# Patient Record
Sex: FEMALE | Race: WHITE | NOT HISPANIC OR LATINO | Employment: OTHER | URBAN - METROPOLITAN AREA
[De-identification: names, ages, dates, MRNs, and addresses within clinical notes are randomized per-mention and may not be internally consistent; named-entity substitution may affect disease eponyms.]

---

## 2024-08-12 ENCOUNTER — APPOINTMENT (OUTPATIENT)
Dept: RADIOLOGY | Facility: HOSPITAL | Age: 64
End: 2024-08-12
Payer: MEDICARE

## 2024-08-12 ENCOUNTER — HOSPITAL ENCOUNTER (OUTPATIENT)
Facility: HOSPITAL | Age: 64
Setting detail: OBSERVATION
Discharge: HOME | End: 2024-08-14
Attending: STUDENT IN AN ORGANIZED HEALTH CARE EDUCATION/TRAINING PROGRAM | Admitting: INTERNAL MEDICINE
Payer: MEDICARE

## 2024-08-12 ENCOUNTER — APPOINTMENT (OUTPATIENT)
Dept: CARDIOLOGY | Facility: HOSPITAL | Age: 64
End: 2024-08-12
Payer: MEDICARE

## 2024-08-12 DIAGNOSIS — H81.11 BENIGN PAROXYSMAL POSITIONAL VERTIGO OF RIGHT EAR: ICD-10-CM

## 2024-08-12 DIAGNOSIS — E13.69 OTHER SPECIFIED DIABETES MELLITUS WITH OTHER SPECIFIED COMPLICATION, UNSPECIFIED WHETHER LONG TERM INSULIN USE (MULTI): ICD-10-CM

## 2024-08-12 DIAGNOSIS — K21.9 GASTROESOPHAGEAL REFLUX DISEASE, UNSPECIFIED WHETHER ESOPHAGITIS PRESENT: ICD-10-CM

## 2024-08-12 DIAGNOSIS — S01.01XA LACERATION OF OCCIPITAL SCALP, INITIAL ENCOUNTER: ICD-10-CM

## 2024-08-12 DIAGNOSIS — I10 PRIMARY HYPERTENSION: ICD-10-CM

## 2024-08-12 DIAGNOSIS — R55 SYNCOPE AND COLLAPSE: Primary | ICD-10-CM

## 2024-08-12 LAB
ALBUMIN SERPL BCP-MCNC: 4.3 G/DL (ref 3.4–5)
ALP SERPL-CCNC: 69 U/L (ref 33–136)
ALT SERPL W P-5'-P-CCNC: 11 U/L (ref 7–45)
ANION GAP BLDV CALCULATED.4IONS-SCNC: 4 MMOL/L (ref 10–25)
ANION GAP SERPL CALC-SCNC: 19 MMOL/L (ref 10–20)
APPEARANCE UR: ABNORMAL
AST SERPL W P-5'-P-CCNC: 10 U/L (ref 9–39)
BASE EXCESS BLDV CALC-SCNC: 1.2 MMOL/L (ref -2–3)
BASOPHILS # BLD AUTO: 0.11 X10*3/UL (ref 0–0.1)
BASOPHILS NFR BLD AUTO: 0.8 %
BILIRUB SERPL-MCNC: 0.9 MG/DL (ref 0–1.2)
BILIRUB UR STRIP.AUTO-MCNC: NEGATIVE MG/DL
BODY TEMPERATURE: ABNORMAL
BUN SERPL-MCNC: 21 MG/DL (ref 6–23)
CA-I BLDV-SCNC: 1.18 MMOL/L (ref 1.1–1.33)
CALCIUM SERPL-MCNC: 10 MG/DL (ref 8.6–10.3)
CARDIAC TROPONIN I PNL SERPL HS: 10 NG/L (ref 0–13)
CARDIAC TROPONIN I PNL SERPL HS: 8 NG/L (ref 0–13)
CHLORIDE BLDV-SCNC: 107 MMOL/L (ref 98–107)
CHLORIDE SERPL-SCNC: 101 MMOL/L (ref 98–107)
CO2 SERPL-SCNC: 25 MMOL/L (ref 21–32)
COLOR UR: ABNORMAL
CREAT SERPL-MCNC: 1.17 MG/DL (ref 0.5–1.05)
EGFRCR SERPLBLD CKD-EPI 2021: 52 ML/MIN/1.73M*2
EOSINOPHIL # BLD AUTO: 0.07 X10*3/UL (ref 0–0.7)
EOSINOPHIL NFR BLD AUTO: 0.5 %
ERYTHROCYTE [DISTWIDTH] IN BLOOD BY AUTOMATED COUNT: 12.9 % (ref 11.5–14.5)
GLUCOSE BLD MANUAL STRIP-MCNC: 163 MG/DL (ref 74–99)
GLUCOSE BLDV-MCNC: 158 MG/DL (ref 74–99)
GLUCOSE SERPL-MCNC: 155 MG/DL (ref 74–99)
GLUCOSE UR STRIP.AUTO-MCNC: NORMAL MG/DL
HCO3 BLDV-SCNC: 27.2 MMOL/L (ref 22–26)
HCT VFR BLD AUTO: 49.2 % (ref 36–46)
HCT VFR BLD EST: 46 % (ref 36–46)
HGB BLD-MCNC: 16.4 G/DL (ref 12–16)
HGB BLDV-MCNC: 15.3 G/DL (ref 12–16)
IMM GRANULOCYTES # BLD AUTO: 0.05 X10*3/UL (ref 0–0.7)
IMM GRANULOCYTES NFR BLD AUTO: 0.3 % (ref 0–0.9)
INHALED O2 CONCENTRATION: 21 %
KETONES UR STRIP.AUTO-MCNC: NEGATIVE MG/DL
LACTATE BLDV-SCNC: 1.4 MMOL/L (ref 0.4–2)
LEUKOCYTE ESTERASE UR QL STRIP.AUTO: ABNORMAL
LYMPHOCYTES # BLD AUTO: 3.55 X10*3/UL (ref 1.2–4.8)
LYMPHOCYTES NFR BLD AUTO: 24.8 %
MAGNESIUM SERPL-MCNC: 1.9 MG/DL (ref 1.6–2.4)
MCH RBC QN AUTO: 29.1 PG (ref 26–34)
MCHC RBC AUTO-ENTMCNC: 33.3 G/DL (ref 32–36)
MCV RBC AUTO: 87 FL (ref 80–100)
MONOCYTES # BLD AUTO: 0.98 X10*3/UL (ref 0.1–1)
MONOCYTES NFR BLD AUTO: 6.8 %
MUCOUS THREADS #/AREA URNS AUTO: ABNORMAL /LPF
NEUTROPHILS # BLD AUTO: 9.55 X10*3/UL (ref 1.2–7.7)
NEUTROPHILS NFR BLD AUTO: 66.8 %
NITRITE UR QL STRIP.AUTO: ABNORMAL
NRBC BLD-RTO: 0 /100 WBCS (ref 0–0)
OXYHGB MFR BLDV: 31.3 % (ref 45–75)
PCO2 BLDV: 47 MM HG (ref 41–51)
PH BLDV: 7.37 PH (ref 7.33–7.43)
PH UR STRIP.AUTO: 5 [PH]
PLATELET # BLD AUTO: 378 X10*3/UL (ref 150–450)
PO2 BLDV: 23 MM HG (ref 35–45)
POTASSIUM BLDV-SCNC: 3.9 MMOL/L (ref 3.5–5.3)
POTASSIUM SERPL-SCNC: 3.7 MMOL/L (ref 3.5–5.3)
PROT SERPL-MCNC: 7.2 G/DL (ref 6.4–8.2)
PROT UR STRIP.AUTO-MCNC: ABNORMAL MG/DL
RBC # BLD AUTO: 5.64 X10*6/UL (ref 4–5.2)
RBC # UR STRIP.AUTO: NEGATIVE /UL
RBC #/AREA URNS AUTO: ABNORMAL /HPF
SAO2 % BLDV: 33 % (ref 45–75)
SODIUM BLDV-SCNC: 134 MMOL/L (ref 136–145)
SODIUM SERPL-SCNC: 141 MMOL/L (ref 136–145)
SP GR UR STRIP.AUTO: 1.02
SQUAMOUS #/AREA URNS AUTO: ABNORMAL /HPF
UROBILINOGEN UR STRIP.AUTO-MCNC: NORMAL MG/DL
WBC # BLD AUTO: 14.3 X10*3/UL (ref 4.4–11.3)
WBC #/AREA URNS AUTO: ABNORMAL /HPF

## 2024-08-12 PROCEDURE — 72125 CT NECK SPINE W/O DYE: CPT | Performed by: RADIOLOGY

## 2024-08-12 PROCEDURE — 96361 HYDRATE IV INFUSION ADD-ON: CPT

## 2024-08-12 PROCEDURE — 96360 HYDRATION IV INFUSION INIT: CPT

## 2024-08-12 PROCEDURE — G0378 HOSPITAL OBSERVATION PER HR: HCPCS

## 2024-08-12 PROCEDURE — 83735 ASSAY OF MAGNESIUM: CPT

## 2024-08-12 PROCEDURE — 93005 ELECTROCARDIOGRAM TRACING: CPT

## 2024-08-12 PROCEDURE — 99285 EMERGENCY DEPT VISIT HI MDM: CPT | Mod: 25

## 2024-08-12 PROCEDURE — 71045 X-RAY EXAM CHEST 1 VIEW: CPT

## 2024-08-12 PROCEDURE — 70450 CT HEAD/BRAIN W/O DYE: CPT | Performed by: RADIOLOGY

## 2024-08-12 PROCEDURE — 71045 X-RAY EXAM CHEST 1 VIEW: CPT | Performed by: RADIOLOGY

## 2024-08-12 PROCEDURE — 82947 ASSAY GLUCOSE BLOOD QUANT: CPT

## 2024-08-12 PROCEDURE — 12002 RPR S/N/AX/GEN/TRNK2.6-7.5CM: CPT

## 2024-08-12 PROCEDURE — 81001 URINALYSIS AUTO W/SCOPE: CPT

## 2024-08-12 PROCEDURE — 36415 COLL VENOUS BLD VENIPUNCTURE: CPT

## 2024-08-12 PROCEDURE — G0390 TRAUMA RESPONS W/HOSP CRITI: HCPCS

## 2024-08-12 PROCEDURE — 87086 URINE CULTURE/COLONY COUNT: CPT | Mod: GEALAB

## 2024-08-12 PROCEDURE — 2500000001 HC RX 250 WO HCPCS SELF ADMINISTERED DRUGS (ALT 637 FOR MEDICARE OP): Performed by: NURSE PRACTITIONER

## 2024-08-12 PROCEDURE — 85025 COMPLETE CBC W/AUTO DIFF WBC: CPT

## 2024-08-12 PROCEDURE — 84484 ASSAY OF TROPONIN QUANT: CPT

## 2024-08-12 PROCEDURE — 82040 ASSAY OF SERUM ALBUMIN: CPT

## 2024-08-12 PROCEDURE — 70450 CT HEAD/BRAIN W/O DYE: CPT

## 2024-08-12 PROCEDURE — 72125 CT NECK SPINE W/O DYE: CPT

## 2024-08-12 PROCEDURE — 2500000004 HC RX 250 GENERAL PHARMACY W/ HCPCS (ALT 636 FOR OP/ED): Performed by: NURSE PRACTITIONER

## 2024-08-12 PROCEDURE — 85018 HEMOGLOBIN: CPT | Mod: 59

## 2024-08-12 PROCEDURE — 2500000001 HC RX 250 WO HCPCS SELF ADMINISTERED DRUGS (ALT 637 FOR MEDICARE OP)

## 2024-08-12 PROCEDURE — 99223 1ST HOSP IP/OBS HIGH 75: CPT | Performed by: INTERNAL MEDICINE

## 2024-08-12 PROCEDURE — 94760 N-INVAS EAR/PLS OXIMETRY 1: CPT

## 2024-08-12 RX ORDER — CARVEDILOL 6.25 MG/1
6.25 TABLET ORAL 2 TIMES DAILY
Status: ON HOLD | COMMUNITY
Start: 2024-01-05 | End: 2024-08-14

## 2024-08-12 RX ORDER — IPRATROPIUM BROMIDE AND ALBUTEROL 20; 100 UG/1; UG/1
1 SPRAY, METERED RESPIRATORY (INHALATION)
COMMUNITY
Start: 2024-03-15 | End: 2024-08-14 | Stop reason: HOSPADM

## 2024-08-12 RX ORDER — DOCUSATE SODIUM 100 MG/1
100 CAPSULE, LIQUID FILLED ORAL 2 TIMES DAILY
Status: DISCONTINUED | OUTPATIENT
Start: 2024-08-12 | End: 2024-08-14 | Stop reason: HOSPADM

## 2024-08-12 RX ORDER — CLOPIDOGREL BISULFATE 75 MG/1
75 TABLET ORAL DAILY
Status: ON HOLD | COMMUNITY
End: 2024-08-14

## 2024-08-12 RX ORDER — ENOXAPARIN SODIUM 100 MG/ML
40 INJECTION SUBCUTANEOUS EVERY 24 HOURS
Status: DISCONTINUED | OUTPATIENT
Start: 2024-08-12 | End: 2024-08-14 | Stop reason: HOSPADM

## 2024-08-12 RX ORDER — AMLODIPINE BESYLATE 10 MG/1
10 TABLET ORAL DAILY
Status: ON HOLD | COMMUNITY
End: 2024-08-14

## 2024-08-12 RX ORDER — SENNOSIDES 8.6 MG
1 TABLET ORAL 2 TIMES DAILY
COMMUNITY
End: 2024-08-14 | Stop reason: HOSPADM

## 2024-08-12 RX ORDER — LIDOCAINE 50 MG/G
1 PATCH TOPICAL
COMMUNITY
Start: 2024-03-15 | End: 2024-08-14 | Stop reason: HOSPADM

## 2024-08-12 RX ORDER — IBUPROFEN 200 MG
1 TABLET ORAL EVERY 24 HOURS
COMMUNITY
End: 2024-08-14 | Stop reason: HOSPADM

## 2024-08-12 RX ORDER — SODIUM CHLORIDE 9 MG/ML
100 INJECTION, SOLUTION INTRAVENOUS CONTINUOUS
Status: DISCONTINUED | OUTPATIENT
Start: 2024-08-12 | End: 2024-08-14 | Stop reason: HOSPADM

## 2024-08-12 RX ORDER — DULAGLUTIDE 3 MG/.5ML
3 INJECTION, SOLUTION SUBCUTANEOUS
Status: ON HOLD | COMMUNITY
End: 2024-08-14

## 2024-08-12 RX ORDER — TALC
3 POWDER (GRAM) TOPICAL NIGHTLY PRN
Status: DISCONTINUED | OUTPATIENT
Start: 2024-08-12 | End: 2024-08-14 | Stop reason: HOSPADM

## 2024-08-12 RX ORDER — ACETAMINOPHEN 325 MG/1
975 TABLET ORAL ONCE
Status: COMPLETED | OUTPATIENT
Start: 2024-08-12 | End: 2024-08-12

## 2024-08-12 RX ORDER — METHOCARBAMOL 500 MG/1
500 TABLET, FILM COATED ORAL EVERY 8 HOURS PRN
COMMUNITY
Start: 2024-03-15 | End: 2024-08-14 | Stop reason: HOSPADM

## 2024-08-12 RX ORDER — NITROGLYCERIN 0.4 MG/1
0.4 TABLET SUBLINGUAL EVERY 5 MIN PRN
COMMUNITY
End: 2024-08-14 | Stop reason: HOSPADM

## 2024-08-12 RX ORDER — VARENICLINE TARTRATE 1 MG/1
1 TABLET, FILM COATED ORAL
COMMUNITY
End: 2024-08-14 | Stop reason: HOSPADM

## 2024-08-12 RX ORDER — OMEPRAZOLE 40 MG/1
40 CAPSULE, DELAYED RELEASE ORAL DAILY
Status: ON HOLD | COMMUNITY
End: 2024-08-14

## 2024-08-12 RX ORDER — OLMESARTAN MEDOXOMIL AND HYDROCHLOROTHIAZIDE 40/25 40; 25 MG/1; MG/1
1 TABLET ORAL DAILY
COMMUNITY
End: 2024-08-14 | Stop reason: HOSPADM

## 2024-08-12 RX ORDER — ATORVASTATIN CALCIUM 40 MG/1
40 TABLET, FILM COATED ORAL DAILY
Status: ON HOLD | COMMUNITY
End: 2024-08-14

## 2024-08-12 SDOH — SOCIAL STABILITY: SOCIAL INSECURITY: DO YOU FEEL UNSAFE GOING BACK TO THE PLACE WHERE YOU ARE LIVING?: NO

## 2024-08-12 SDOH — SOCIAL STABILITY: SOCIAL INSECURITY: HAVE YOU HAD ANY THOUGHTS OF HARMING ANYONE ELSE?: NO

## 2024-08-12 SDOH — SOCIAL STABILITY: SOCIAL INSECURITY: ARE THERE ANY APPARENT SIGNS OF INJURIES/BEHAVIORS THAT COULD BE RELATED TO ABUSE/NEGLECT?: NO

## 2024-08-12 SDOH — SOCIAL STABILITY: SOCIAL INSECURITY: DO YOU FEEL ANYONE HAS EXPLOITED OR TAKEN ADVANTAGE OF YOU FINANCIALLY OR OF YOUR PERSONAL PROPERTY?: NO

## 2024-08-12 SDOH — SOCIAL STABILITY: SOCIAL INSECURITY: HAS ANYONE EVER THREATENED TO HURT YOUR FAMILY OR YOUR PETS?: NO

## 2024-08-12 SDOH — SOCIAL STABILITY: SOCIAL INSECURITY: DOES ANYONE TRY TO KEEP YOU FROM HAVING/CONTACTING OTHER FRIENDS OR DOING THINGS OUTSIDE YOUR HOME?: NO

## 2024-08-12 SDOH — SOCIAL STABILITY: SOCIAL INSECURITY: ABUSE: ADULT

## 2024-08-12 SDOH — SOCIAL STABILITY: SOCIAL INSECURITY: HAVE YOU HAD THOUGHTS OF HARMING ANYONE ELSE?: NO

## 2024-08-12 SDOH — SOCIAL STABILITY: SOCIAL INSECURITY: WERE YOU ABLE TO COMPLETE ALL THE BEHAVIORAL HEALTH SCREENINGS?: YES

## 2024-08-12 SDOH — SOCIAL STABILITY: SOCIAL INSECURITY: ARE YOU OR HAVE YOU BEEN THREATENED OR ABUSED PHYSICALLY, EMOTIONALLY, OR SEXUALLY BY ANYONE?: NO

## 2024-08-12 ASSESSMENT — COGNITIVE AND FUNCTIONAL STATUS - GENERAL
STANDING UP FROM CHAIR USING ARMS: A LITTLE
MOVING TO AND FROM BED TO CHAIR: A LITTLE
TURNING FROM BACK TO SIDE WHILE IN FLAT BAD: A LITTLE
MOVING FROM LYING ON BACK TO SITTING ON SIDE OF FLAT BED WITH BEDRAILS: A LITTLE
CLIMB 3 TO 5 STEPS WITH RAILING: A LITTLE
DRESSING REGULAR LOWER BODY CLOTHING: A LITTLE
DAILY ACTIVITIY SCORE: 23
PATIENT BASELINE BEDBOUND: NO
WALKING IN HOSPITAL ROOM: A LITTLE
MOBILITY SCORE: 18

## 2024-08-12 ASSESSMENT — ACTIVITIES OF DAILY LIVING (ADL)
DRESSING YOURSELF: INDEPENDENT
GROOMING: INDEPENDENT
JUDGMENT_ADEQUATE_SAFELY_COMPLETE_DAILY_ACTIVITIES: YES
HEARING - RIGHT EAR: FUNCTIONAL
FEEDING YOURSELF: INDEPENDENT
ADEQUATE_TO_COMPLETE_ADL: YES
HEARING - LEFT EAR: FUNCTIONAL
PATIENT'S MEMORY ADEQUATE TO SAFELY COMPLETE DAILY ACTIVITIES?: YES
LACK_OF_TRANSPORTATION: NO
TOILETING: INDEPENDENT
BATHING: INDEPENDENT
WALKS IN HOME: INDEPENDENT

## 2024-08-12 ASSESSMENT — COLUMBIA-SUICIDE SEVERITY RATING SCALE - C-SSRS
2. HAVE YOU ACTUALLY HAD ANY THOUGHTS OF KILLING YOURSELF?: NO
1. IN THE PAST MONTH, HAVE YOU WISHED YOU WERE DEAD OR WISHED YOU COULD GO TO SLEEP AND NOT WAKE UP?: NO
6. HAVE YOU EVER DONE ANYTHING, STARTED TO DO ANYTHING, OR PREPARED TO DO ANYTHING TO END YOUR LIFE?: NO

## 2024-08-12 ASSESSMENT — LIFESTYLE VARIABLES
HOW OFTEN DO YOU HAVE 6 OR MORE DRINKS ON ONE OCCASION: NEVER
HAVE PEOPLE ANNOYED YOU BY CRITICIZING YOUR DRINKING: NO
TOTAL SCORE: 0
AUDIT-C TOTAL SCORE: 1
HAVE YOU EVER FELT YOU SHOULD CUT DOWN ON YOUR DRINKING: NO
AUDIT-C TOTAL SCORE: 1
HOW MANY STANDARD DRINKS CONTAINING ALCOHOL DO YOU HAVE ON A TYPICAL DAY: 1 OR 2
EVER HAD A DRINK FIRST THING IN THE MORNING TO STEADY YOUR NERVES TO GET RID OF A HANGOVER: NO
EVER FELT BAD OR GUILTY ABOUT YOUR DRINKING: NO
HOW OFTEN DO YOU HAVE A DRINK CONTAINING ALCOHOL: MONTHLY OR LESS
SKIP TO QUESTIONS 9-10: 1

## 2024-08-12 ASSESSMENT — PAIN DESCRIPTION - LOCATION: LOCATION: HEAD

## 2024-08-12 ASSESSMENT — PATIENT HEALTH QUESTIONNAIRE - PHQ9
2. FEELING DOWN, DEPRESSED OR HOPELESS: NOT AT ALL
1. LITTLE INTEREST OR PLEASURE IN DOING THINGS: NOT AT ALL
SUM OF ALL RESPONSES TO PHQ9 QUESTIONS 1 & 2: 0

## 2024-08-12 ASSESSMENT — PAIN - FUNCTIONAL ASSESSMENT
PAIN_FUNCTIONAL_ASSESSMENT: 0-10
PAIN_FUNCTIONAL_ASSESSMENT: 0-10

## 2024-08-12 ASSESSMENT — PAIN SCALES - GENERAL
PAINLEVEL_OUTOF10: 5 - MODERATE PAIN
PAINLEVEL_OUTOF10: 3

## 2024-08-12 ASSESSMENT — PAIN DESCRIPTION - DESCRIPTORS: DESCRIPTORS: ACHING

## 2024-08-12 ASSESSMENT — PAIN DESCRIPTION - PAIN TYPE: TYPE: ACUTE PAIN

## 2024-08-12 NOTE — ED PROVIDER NOTES
CC: Syncope     HPI:   Patient placed 64-year-old female with history of hypertension, 2 prior strokes without significant deficits, diabetes on insulin (has been out of insulin for the past 2 months), tobacco use disorder, arthritis presenting due to a syncopal episode and fall.  Patient reports that she was at the laundromat earlier today and felt significantly hot and had some left shoulder pain.  She was try to go to the bathroom to put a cold rag on her neck and use the restroom however had a syncopal episode.  Patient is unsure if she had a seizure or syncopal episode but does remember losing consciousness.  She is not sure how long she was out more but yelled for help and was unable to walk afterwards.  Patient has been out of her insulin for the past 2 months due to insurance issues and reports that her blood sugars been in the high 200s at home with increased urinary frequency and thirst.  She denies any previous syncopal episodes and is on blood thinners given her history of stroke.  Patient did not vomit prior to her syncopal episode but did have an episode of emesis while en route and was given 4 mg of Zofran.  Does have significant head pain and sustained a laceration to her posterior scalp that is currently hemostatic. She denies any recent fevers or chills, abdominal pain, acute weakness or numbness in her upper or lower extremities.    Limitations to History: none  Additional History Obtained from: EMS    PMHx/PSHx:  Per HPI.   - has no past medical history on file.  - has no past surgical history on file.    Social History:  - Tobacco:  reports that she has been smoking cigarettes. She has never used smokeless tobacco.   - Alcohol:  reports that she does not currently use alcohol.   - Drugs:  reports no history of drug use.     Medications: Reviewed in EMR.     Allergies:  Percocet [oxycodone-acetaminophen] and  Succinylcholine    ???????????????????????????????????????????????????????????????  Triage Vitals:  T 36.6 °C (97.9 °F)  HR 79  /69  RR 18  O2 95 % None (Room air)    Physical Exam  Vitals and nursing note reviewed.   Constitutional:       General: She is not in acute distress.     Appearance: She is well-developed.   HENT:      Head: Normocephalic.      Comments: Occipital cephalhematoma with a 3 cm laceration that is slightly gaping without active bleeding     Right Ear: Tympanic membrane and external ear normal.      Left Ear: Tympanic membrane and external ear normal.      Nose: Nose normal. No congestion or rhinorrhea.      Mouth/Throat:      Mouth: Mucous membranes are dry.      Pharynx: Oropharynx is clear.   Eyes:      Extraocular Movements: Extraocular movements intact.      Conjunctiva/sclera: Conjunctivae normal.      Pupils: Pupils are equal, round, and reactive to light.   Cardiovascular:      Rate and Rhythm: Normal rate and regular rhythm.      Pulses: Normal pulses.      Heart sounds: Normal heart sounds. No murmur heard.  Pulmonary:      Effort: Pulmonary effort is normal. No respiratory distress.      Breath sounds: Normal breath sounds.   Chest:      Chest wall: No tenderness.   Abdominal:      Palpations: Abdomen is soft.      Tenderness: There is abdominal tenderness. There is no guarding or rebound.   Musculoskeletal:         General: Tenderness present. No swelling.      Cervical back: No tenderness (No tenderness but c-collar present upon initial evaluation).   Skin:     General: Skin is warm and dry.      Capillary Refill: Capillary refill takes less than 2 seconds.   Neurological:      Mental Status: She is alert and oriented to person, place, and time.      Cranial Nerves: No cranial nerve deficit.      Motor: No weakness.      Gait: Gait normal.       ???????????????????????????????????????????????????????????????  EKG (per my interpretation):  Sinus rhythm with a rate of 75.   No MA QRS punctation.  T wave inversions noted in V1 and V2.  No acute ST elevations.  No MARQUITA.  QTc 431.    ED Course  ED Course as of 08/13/24 1233   Mon Aug 12, 2024   1745 Patient received Boostrix in 2021 [RR]      ED Course User Index  [RR] Keyanna Rich MD         Diagnoses as of 08/13/24 1233   Syncope and collapse   Other specified diabetes mellitus with other specified complication, unspecified whether long term insulin use (Multi)       Medical Decision Making:  Patient is a 64-year-old female with history of hypertension, prior CVA, diabetes, tobacco use disorder, arthritis presenting due to syncope and fall.  Patient was activated as a HIA given the fact that she is on Plavix and has a significant head laceration.  She has a GCS of 15 and had basic lab work obtained to evaluate for both symptoms of ACS as well as DKA.  Patient CT head was read as no acute infarct or hemorrhage and does have chronic old infarcts that are slightly more pronounced on the right posterior frontal lobe.  C-spine is negative and c-collar was cleared.  Patient's wound was washed out thoroughly and stapled closed.  Patient became significantly agitated over the course of her ED stay because she was not fed and she reports that she was a diabetic and needs to eat.  Patient was offered food that she declined.  Patient has slightly elevated creatinine compared to baseline and does have a leukocytosis that is consistent with her fall.  Patient was requesting antibiotics given her leukocytosis however at this time there is no obvious source of infectious symptoms and it was deferred.  Patient was also upset about this.  She was admitted to medicine pending urinalysis collection for further workup and management of patient's syncopal cause.  Patient attending physician agreed with plan disposition patient was accepted to medicine.    External records reviewed: recent inpatient, clinic, and prior ED notes  Diagnostic imaging  independently reviewed/interpreted by me (as reflected in MDM) includes: CT head and CT C-spine, chest x-ray  Social Determinants Affecting Care: Poor health literacy  Discussion of management with other providers: Attending  Prescription Drug Consideration: Per inpatient team  Escalation of Care: Admission    Impression:   Syncope with fall  Head laceration   SUDHAKAR    Disposition: Admitted      Laceration Repair    Performed by: Keyanna Rich MD  Authorized by: Ishmael Driver MD    Consent:     Consent obtained:  Verbal    Consent given by:  Patient    Risks, benefits, and alternatives were discussed: yes      Risks discussed:  Infection, poor cosmetic result, poor wound healing, need for additional repair, pain and vascular damage  Universal protocol:     Procedure explained and questions answered to patient or proxy's satisfaction: yes      Imaging studies available: yes      Patient identity confirmed:  Verbally with patient  Anesthesia:     Anesthesia method:  Topical application    Topical anesthetic:  LET  Laceration details:     Location:  Scalp    Scalp location:  Occipital    Length (cm):  3    Depth (mm):  1.5  Exploration:     Limited defect created (wound extended): no      Hemostasis achieved with:  Direct pressure    Imaging outcome: foreign body not noted      Wound exploration: entire depth of wound visualized      Wound extent: no foreign bodies/material noted      Contaminated: no    Treatment:     Area cleansed with:  Saline    Amount of cleaning:  Extensive    Irrigation solution:  Sterile saline    Irrigation method:  Pressure wash    Debridement:  None  Skin repair:     Repair method:  Staples    Number of staples:  4  Approximation:     Approximation:  Close  Repair type:     Repair type:  Simple  Post-procedure details:     Dressing:  Open (no dressing)    Procedure completion:  Tolerated well, no immediate complications   ? [employer] last updated 8/12/2024 3:54 PM     Keyanna  Hilario  PGY-2 Emergency Medicine  [facility]     Keyanna Rich MD  Resident  08/13/24 1689

## 2024-08-12 NOTE — ED TRIAGE NOTES
Patient had a syncopal episode today in the bathroom and fell backwards hitting the back of her head on the tile floor. Patient has a laceration to the back of her head, unsure if she had LOC. Patient did vomit once in route, was given 4mg of zofran IV.

## 2024-08-13 LAB
ATRIAL RATE: 75 BPM
EST. AVERAGE GLUCOSE BLD GHB EST-MCNC: 128 MG/DL
GLUCOSE BLD MANUAL STRIP-MCNC: 111 MG/DL (ref 74–99)
GLUCOSE BLD MANUAL STRIP-MCNC: 135 MG/DL (ref 74–99)
GLUCOSE BLD MANUAL STRIP-MCNC: 151 MG/DL (ref 74–99)
GLUCOSE BLD MANUAL STRIP-MCNC: 165 MG/DL (ref 74–99)
HBA1C MFR BLD: 6.1 %
HOLD SPECIMEN: NORMAL
P AXIS: 76 DEGREES
P OFFSET: 184 MS
P ONSET: 126 MS
PR INTERVAL: 198 MS
Q ONSET: 225 MS
QRS COUNT: 13 BEATS
QRS DURATION: 92 MS
QT INTERVAL: 386 MS
QTC CALCULATION(BAZETT): 431 MS
QTC FREDERICIA: 415 MS
R AXIS: 34 DEGREES
T AXIS: 56 DEGREES
T OFFSET: 418 MS
VENTRICULAR RATE: 75 BPM

## 2024-08-13 PROCEDURE — 2500000001 HC RX 250 WO HCPCS SELF ADMINISTERED DRUGS (ALT 637 FOR MEDICARE OP): Performed by: INTERNAL MEDICINE

## 2024-08-13 PROCEDURE — 96372 THER/PROPH/DIAG INJ SC/IM: CPT | Mod: 59 | Performed by: NURSE PRACTITIONER

## 2024-08-13 PROCEDURE — 36415 COLL VENOUS BLD VENIPUNCTURE: CPT | Performed by: INTERNAL MEDICINE

## 2024-08-13 PROCEDURE — G0378 HOSPITAL OBSERVATION PER HR: HCPCS

## 2024-08-13 PROCEDURE — 2500000005 HC RX 250 GENERAL PHARMACY W/O HCPCS: Performed by: INTERNAL MEDICINE

## 2024-08-13 PROCEDURE — 94760 N-INVAS EAR/PLS OXIMETRY 1: CPT

## 2024-08-13 PROCEDURE — S4991 NICOTINE PATCH NONLEGEND: HCPCS | Performed by: INTERNAL MEDICINE

## 2024-08-13 PROCEDURE — 99232 SBSQ HOSP IP/OBS MODERATE 35: CPT | Performed by: STUDENT IN AN ORGANIZED HEALTH CARE EDUCATION/TRAINING PROGRAM

## 2024-08-13 PROCEDURE — 2500000001 HC RX 250 WO HCPCS SELF ADMINISTERED DRUGS (ALT 637 FOR MEDICARE OP): Performed by: NURSE PRACTITIONER

## 2024-08-13 PROCEDURE — 12002 RPR S/N/AX/GEN/TRNK2.6-7.5CM: CPT | Performed by: INTERNAL MEDICINE

## 2024-08-13 PROCEDURE — 2500000004 HC RX 250 GENERAL PHARMACY W/ HCPCS (ALT 636 FOR OP/ED): Performed by: NURSE PRACTITIONER

## 2024-08-13 PROCEDURE — 2500000002 HC RX 250 W HCPCS SELF ADMINISTERED DRUGS (ALT 637 FOR MEDICARE OP, ALT 636 FOR OP/ED): Performed by: INTERNAL MEDICINE

## 2024-08-13 PROCEDURE — 82947 ASSAY GLUCOSE BLOOD QUANT: CPT

## 2024-08-13 PROCEDURE — 83036 HEMOGLOBIN GLYCOSYLATED A1C: CPT | Mod: GEALAB | Performed by: INTERNAL MEDICINE

## 2024-08-13 PROCEDURE — 2500000001 HC RX 250 WO HCPCS SELF ADMINISTERED DRUGS (ALT 637 FOR MEDICARE OP): Performed by: STUDENT IN AN ORGANIZED HEALTH CARE EDUCATION/TRAINING PROGRAM

## 2024-08-13 RX ORDER — NITROGLYCERIN 0.4 MG/1
0.4 TABLET SUBLINGUAL EVERY 5 MIN PRN
Status: DISCONTINUED | OUTPATIENT
Start: 2024-08-13 | End: 2024-08-14 | Stop reason: HOSPADM

## 2024-08-13 RX ORDER — OLMESARTAN MEDOXOMIL AND HYDROCHLOROTHIAZIDE 40/25 40; 25 MG/1; MG/1
1 TABLET ORAL DAILY
Status: DISCONTINUED | OUTPATIENT
Start: 2024-08-13 | End: 2024-08-13

## 2024-08-13 RX ORDER — CARVEDILOL 6.25 MG/1
6.25 TABLET ORAL 2 TIMES DAILY
Status: DISCONTINUED | OUTPATIENT
Start: 2024-08-13 | End: 2024-08-14 | Stop reason: HOSPADM

## 2024-08-13 RX ORDER — HYDROCHLOROTHIAZIDE 25 MG/1
25 TABLET ORAL DAILY
Status: DISCONTINUED | OUTPATIENT
Start: 2024-08-13 | End: 2024-08-14 | Stop reason: HOSPADM

## 2024-08-13 RX ORDER — AMLODIPINE BESYLATE 10 MG/1
10 TABLET ORAL DAILY
Status: DISCONTINUED | OUTPATIENT
Start: 2024-08-13 | End: 2024-08-14 | Stop reason: HOSPADM

## 2024-08-13 RX ORDER — ACETAMINOPHEN 650 MG/1
650 SUPPOSITORY RECTAL EVERY 4 HOURS PRN
Status: DISCONTINUED | OUTPATIENT
Start: 2024-08-13 | End: 2024-08-14 | Stop reason: HOSPADM

## 2024-08-13 RX ORDER — IPRATROPIUM BROMIDE AND ALBUTEROL SULFATE 2.5; .5 MG/3ML; MG/3ML
3 SOLUTION RESPIRATORY (INHALATION)
Status: DISCONTINUED | OUTPATIENT
Start: 2024-08-13 | End: 2024-08-13

## 2024-08-13 RX ORDER — CLOPIDOGREL BISULFATE 75 MG/1
75 TABLET ORAL DAILY
Status: DISCONTINUED | OUTPATIENT
Start: 2024-08-13 | End: 2024-08-14 | Stop reason: HOSPADM

## 2024-08-13 RX ORDER — NITROFURANTOIN 25; 75 MG/1; MG/1
100 CAPSULE ORAL 2 TIMES DAILY
Status: DISCONTINUED | OUTPATIENT
Start: 2024-08-13 | End: 2024-08-14 | Stop reason: HOSPADM

## 2024-08-13 RX ORDER — ACETAMINOPHEN 160 MG/5ML
650 SOLUTION ORAL EVERY 4 HOURS PRN
Status: DISCONTINUED | OUTPATIENT
Start: 2024-08-13 | End: 2024-08-14 | Stop reason: HOSPADM

## 2024-08-13 RX ORDER — VALSARTAN 160 MG/1
320 TABLET ORAL DAILY
Status: DISCONTINUED | OUTPATIENT
Start: 2024-08-13 | End: 2024-08-14 | Stop reason: HOSPADM

## 2024-08-13 RX ORDER — METHOCARBAMOL 500 MG/1
500 TABLET, FILM COATED ORAL EVERY 8 HOURS PRN
Status: DISCONTINUED | OUTPATIENT
Start: 2024-08-13 | End: 2024-08-14 | Stop reason: HOSPADM

## 2024-08-13 RX ORDER — DEXTROSE 50 % IN WATER (D50W) INTRAVENOUS SYRINGE
12.5
Status: DISCONTINUED | OUTPATIENT
Start: 2024-08-13 | End: 2024-08-14 | Stop reason: HOSPADM

## 2024-08-13 RX ORDER — IPRATROPIUM BROMIDE AND ALBUTEROL SULFATE 2.5; .5 MG/3ML; MG/3ML
3 SOLUTION RESPIRATORY (INHALATION) EVERY 2 HOUR PRN
Status: DISCONTINUED | OUTPATIENT
Start: 2024-08-13 | End: 2024-08-14 | Stop reason: HOSPADM

## 2024-08-13 RX ORDER — LIDOCAINE 560 MG/1
1 PATCH PERCUTANEOUS; TOPICAL; TRANSDERMAL DAILY
Status: DISCONTINUED | OUTPATIENT
Start: 2024-08-13 | End: 2024-08-14 | Stop reason: HOSPADM

## 2024-08-13 RX ORDER — HYDRALAZINE HYDROCHLORIDE 10 MG/1
10 TABLET, FILM COATED ORAL 3 TIMES DAILY PRN
Status: DISCONTINUED | OUTPATIENT
Start: 2024-08-13 | End: 2024-08-14 | Stop reason: HOSPADM

## 2024-08-13 RX ORDER — ATORVASTATIN CALCIUM 40 MG/1
40 TABLET, FILM COATED ORAL DAILY
Status: DISCONTINUED | OUTPATIENT
Start: 2024-08-13 | End: 2024-08-14 | Stop reason: HOSPADM

## 2024-08-13 RX ORDER — ACETAMINOPHEN 325 MG/1
650 TABLET ORAL EVERY 4 HOURS PRN
Status: DISCONTINUED | OUTPATIENT
Start: 2024-08-13 | End: 2024-08-14 | Stop reason: HOSPADM

## 2024-08-13 RX ORDER — VARENICLINE TARTRATE 1 MG/1
1 TABLET, FILM COATED ORAL
Status: DISCONTINUED | OUTPATIENT
Start: 2024-08-13 | End: 2024-08-14 | Stop reason: HOSPADM

## 2024-08-13 RX ORDER — PANTOPRAZOLE SODIUM 40 MG/1
40 TABLET, DELAYED RELEASE ORAL
Status: DISCONTINUED | OUTPATIENT
Start: 2024-08-13 | End: 2024-08-14 | Stop reason: HOSPADM

## 2024-08-13 RX ORDER — INSULIN LISPRO 100 [IU]/ML
0-10 INJECTION, SOLUTION INTRAVENOUS; SUBCUTANEOUS
Status: DISCONTINUED | OUTPATIENT
Start: 2024-08-13 | End: 2024-08-14 | Stop reason: HOSPADM

## 2024-08-13 RX ORDER — DEXTROSE 50 % IN WATER (D50W) INTRAVENOUS SYRINGE
25
Status: DISCONTINUED | OUTPATIENT
Start: 2024-08-13 | End: 2024-08-14 | Stop reason: HOSPADM

## 2024-08-13 RX ORDER — SENNOSIDES 8.6 MG/1
1 TABLET ORAL 2 TIMES DAILY
Status: DISCONTINUED | OUTPATIENT
Start: 2024-08-13 | End: 2024-08-14 | Stop reason: HOSPADM

## 2024-08-13 RX ORDER — IBUPROFEN 200 MG
1 TABLET ORAL EVERY 24 HOURS
Status: DISCONTINUED | OUTPATIENT
Start: 2024-08-13 | End: 2024-08-14 | Stop reason: HOSPADM

## 2024-08-13 ASSESSMENT — COGNITIVE AND FUNCTIONAL STATUS - GENERAL
MOBILITY SCORE: 20
DAILY ACTIVITIY SCORE: 24
CLIMB 3 TO 5 STEPS WITH RAILING: A LITTLE
STANDING UP FROM CHAIR USING ARMS: A LITTLE
WALKING IN HOSPITAL ROOM: A LITTLE
TOILETING: A LITTLE
MOVING TO AND FROM BED TO CHAIR: A LITTLE
CLIMB 3 TO 5 STEPS WITH RAILING: A LITTLE
MOBILITY SCORE: 20
MOVING TO AND FROM BED TO CHAIR: A LITTLE
STANDING UP FROM CHAIR USING ARMS: A LITTLE
DAILY ACTIVITIY SCORE: 23
WALKING IN HOSPITAL ROOM: A LITTLE

## 2024-08-13 ASSESSMENT — PAIN SCALES - GENERAL
PAINLEVEL_OUTOF10: 0 - NO PAIN
PAINLEVEL_OUTOF10: 3
PAINLEVEL_OUTOF10: 0 - NO PAIN
PAINLEVEL_OUTOF10: 3

## 2024-08-13 ASSESSMENT — ENCOUNTER SYMPTOMS
ALLERGIC/IMMUNOLOGIC NEGATIVE: 1
GASTROINTESTINAL NEGATIVE: 1
HEMATOLOGIC/LYMPHATIC NEGATIVE: 1
DIAPHORESIS: 1
MUSCULOSKELETAL NEGATIVE: 1
EYES NEGATIVE: 1
PSYCHIATRIC NEGATIVE: 1
RESPIRATORY NEGATIVE: 1
ENDOCRINE COMMENTS: PT IS DIABETIC

## 2024-08-13 ASSESSMENT — PAIN DESCRIPTION - LOCATION: LOCATION: HEAD

## 2024-08-13 ASSESSMENT — PAIN - FUNCTIONAL ASSESSMENT
PAIN_FUNCTIONAL_ASSESSMENT: 0-10

## 2024-08-13 ASSESSMENT — ACTIVITIES OF DAILY LIVING (ADL): LACK_OF_TRANSPORTATION: NO

## 2024-08-13 NOTE — PROGRESS NOTES
Esme Hicks is a 64 y.o. female on day 0 of admission presenting with Syncope and collapse.      Subjective   Was comfortable this morning when seen at 1000. Did endorse dizziness on standing. Later in the afternoon, frustrated with BP readings.        Objective     Last Recorded Vitals  /75   Pulse 69   Temp 36.7 °C (98.1 °F) (Temporal)   Resp 18   Wt 64.7 kg (142 lb 11.2 oz)   SpO2 93%   Intake/Output last 3 Shifts:    Intake/Output Summary (Last 24 hours) at 8/13/2024 1611  Last data filed at 8/13/2024 1335  Gross per 24 hour   Intake 1440 ml   Output 400 ml   Net 1040 ml       Admission Weight  Weight: 69.4 kg (153 lb) (08/12/24 1508)    Daily Weight  08/12/24 : 64.7 kg (142 lb 11.2 oz)    Image Results  ECG 12 lead  Normal sinus rhythm  Possible Left atrial enlargement  Septal infarct , age undetermined  Abnormal ECG  No previous ECGs available      Physical Exam  Constitutional:       Appearance: Normal appearance.   Cardiovascular:      Rate and Rhythm: Normal rate and regular rhythm.      Heart sounds: No murmur heard.  Pulmonary:      Effort: Pulmonary effort is normal.      Breath sounds: Normal breath sounds. No wheezing.   Abdominal:      General: Abdomen is flat. Bowel sounds are normal. There is no distension.      Palpations: Abdomen is soft.      Tenderness: There is no abdominal tenderness.   Neurological:      General: No focal deficit present.      Mental Status: She is alert.         Relevant Results             Scheduled medications  amLODIPine, 10 mg, oral, Daily  atorvastatin, 40 mg, oral, Daily  carvedilol, 6.25 mg, oral, BID  clopidogrel, 75 mg, oral, Daily  diph,pertuss(acel),tet vac (PF), 0.5 mL, intramuscular, Once  docusate sodium, 100 mg, oral, BID  enoxaparin, 40 mg, subcutaneous, q24h  valsartan, 320 mg, oral, Daily   And  hydroCHLOROthiazide, 25 mg, oral, Daily  insulin lispro, 0-10 Units, subcutaneous, 4x daily  lidocaine, 1 patch, transdermal, Daily  nicotine, 1  patch, transdermal, q24h  nitrofurantoin (macrocrystal-monohydrate), 100 mg, oral, BID  pantoprazole, 40 mg, oral, Daily before breakfast  sennosides, 1 tablet, oral, BID  varenicline, 1 mg, oral, BID      Continuous medications  sodium chloride 0.9%, 100 mL/hr, Last Rate: 100 mL/hr (08/13/24 0502)      PRN medications  PRN medications: acetaminophen **OR** acetaminophen **OR** acetaminophen, dextrose, dextrose, glucagon, glucagon, hydrALAZINE, ipratropium-albuteroL, melatonin, methocarbamol, nitroglycerin    Results for orders placed or performed during the hospital encounter of 08/12/24 (from the past 24 hour(s))   Troponin, High Sensitivity, 1 Hour   Result Value Ref Range    Troponin I, High Sensitivity 10 0 - 13 ng/L   Urinalysis with Reflex Culture and Microscopic   Result Value Ref Range    Color, Urine Light-Yellow Light-Yellow, Yellow, Dark-Yellow    Appearance, Urine Turbid (N) Clear    Specific Gravity, Urine 1.016 1.005 - 1.035    pH, Urine 5.0 5.0, 5.5, 6.0, 6.5, 7.0, 7.5, 8.0    Protein, Urine 10 (TRACE) NEGATIVE, 10 (TRACE), 20 (TRACE) mg/dL    Glucose, Urine Normal Normal mg/dL    Blood, Urine NEGATIVE NEGATIVE    Ketones, Urine NEGATIVE NEGATIVE mg/dL    Bilirubin, Urine NEGATIVE NEGATIVE    Urobilinogen, Urine Normal Normal mg/dL    Nitrite, Urine 1+ (A) NEGATIVE    Leukocyte Esterase, Urine 75 Luis Daniel/µL (A) NEGATIVE   Extra Urine Gray Tube   Result Value Ref Range    Extra Tube Hold for add-ons.    Microscopic Only, Urine   Result Value Ref Range    WBC, Urine 21-50 (A) 1-5, NONE /HPF    RBC, Urine 1-2 NONE, 1-2, 3-5 /HPF    Squamous Epithelial Cells, Urine 1-9 (SPARSE) Reference range not established. /HPF    Mucus, Urine FEW Reference range not established. /LPF   POCT GLUCOSE   Result Value Ref Range    POCT Glucose 135 (H) 74 - 99 mg/dL   POCT GLUCOSE   Result Value Ref Range    POCT Glucose 165 (H) 74 - 99 mg/dL   POCT GLUCOSE   Result Value Ref Range    POCT Glucose 111 (H) 74 - 99 mg/dL          Assessment/Plan                  Principal Problem:    Syncope and collapse  -Telemetry (no events while one)  -Echo ordered  -Cards consulted  -Treat UTI as below  -Pt ordered  -Orthostats ordered    Fall with head injury  -2/2 above  -Stabled per ID  -Tetanus booster given in ED  -Supportive care    Acute cystitis  -Continue Nitrofurantoin  -Cx pending    HTN  -Off home medications and resumed. Added PRN med for SBP > 190 (was likely 2/2 agitation)    Tobacco use  -Nicotine patch and vareniciline    T2DM  -Truliciy at home (off many months)  -Accu checks and ISS  -A1c     Dispo: pending PT eval, likely home in the next 1-2 days       Mehul Francisco MD

## 2024-08-13 NOTE — PROGRESS NOTES
08/13/24 1006   Discharge Planning   Living Arrangements Spouse/significant other   Support Systems Spouse/significant other;Children   Assistance Needed Patient is A&Ox3, on room air at baseline, is independent with ADL's and uses a walker PRN, drives. Patient requesting to see SW regarding issues obtaining medications, SW notified and aware.   Type of Residence Private residence   Who is requesting discharge planning? Provider   Home or Post Acute Services None   Expected Discharge Disposition Home   Does the patient need discharge transport arranged? No   Financial Resource Strain   How hard is it for you to pay for the very basics like food, housing, medical care, and heating? Hard  (no medical insurance or medications for 2 months. Applied for Medicaid, needs to reapply.)   Housing Stability   In the last 12 months, was there a time when you were not able to pay the mortgage or rent on time? N   In the past 12 months, how many times have you moved where you were living? 0   At any time in the past 12 months, were you homeless or living in a shelter (including now)? N   Transportation Needs   In the past 12 months, has lack of transportation kept you from medical appointments or from getting medications? no   In the past 12 months, has lack of transportation kept you from meetings, work, or from getting things needed for daily living? No
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
## 2024-08-13 NOTE — PROGRESS NOTES
Physical Therapy                 Therapy Communication Note    Patient Name: Esme Hicks  MRN: [id number]  Today's Date: 8/13/2024     Discipline: Physical Therapy    Missed Visit Reason: Missed Visit Reason: Patient placed on medical hold (Patient with elevated BP, upon assessment in supine 196/84, HR 85. Hospitalist arrived in room, provided with info. Therapy assessment held at this time.)    Missed Time: Attempt    Comment: Patient reports she lives with her  in a one story home, 2 ANAI without rails, uses a walker occ, independent ADLs/IADLs

## 2024-08-14 ENCOUNTER — APPOINTMENT (OUTPATIENT)
Dept: CARDIOLOGY | Facility: HOSPITAL | Age: 64
End: 2024-08-14
Payer: MEDICARE

## 2024-08-14 ENCOUNTER — PHARMACY VISIT (OUTPATIENT)
Dept: PHARMACY | Facility: CLINIC | Age: 64
End: 2024-08-14
Payer: COMMERCIAL

## 2024-08-14 ENCOUNTER — APPOINTMENT (OUTPATIENT)
Dept: VASCULAR MEDICINE | Facility: HOSPITAL | Age: 64
End: 2024-08-14
Payer: MEDICARE

## 2024-08-14 VITALS
SYSTOLIC BLOOD PRESSURE: 159 MMHG | DIASTOLIC BLOOD PRESSURE: 68 MMHG | HEIGHT: 60 IN | HEART RATE: 75 BPM | WEIGHT: 142.7 LBS | RESPIRATION RATE: 18 BRPM | OXYGEN SATURATION: 94 % | BODY MASS INDEX: 28.02 KG/M2 | TEMPERATURE: 97.3 F

## 2024-08-14 PROBLEM — H81.11 BENIGN PAROXYSMAL POSITIONAL VERTIGO OF RIGHT EAR: Status: ACTIVE | Noted: 2024-08-14

## 2024-08-14 PROBLEM — E13.69 OTHER SPECIFIED DIABETES MELLITUS WITH OTHER SPECIFIED COMPLICATION (MULTI): Chronic | Status: ACTIVE | Noted: 2024-08-14

## 2024-08-14 PROBLEM — R55 SYNCOPE AND COLLAPSE: Status: RESOLVED | Noted: 2024-08-12 | Resolved: 2024-08-14

## 2024-08-14 LAB
ANION GAP SERPL CALC-SCNC: 13 MMOL/L (ref 10–20)
AORTIC VALVE MEAN GRADIENT: 3.5 MMHG
AORTIC VALVE PEAK VELOCITY: 1.21 M/S
AV PEAK GRADIENT: 5.8 MMHG
AVA (PEAK VEL): 2.15 CM2
AVA (VTI): 2.43 CM2
BUN SERPL-MCNC: 18 MG/DL (ref 6–23)
CALCIUM SERPL-MCNC: 9.2 MG/DL (ref 8.6–10.3)
CHLORIDE SERPL-SCNC: 102 MMOL/L (ref 98–107)
CO2 SERPL-SCNC: 25 MMOL/L (ref 21–32)
CREAT SERPL-MCNC: 0.71 MG/DL (ref 0.5–1.05)
EGFRCR SERPLBLD CKD-EPI 2021: >90 ML/MIN/1.73M*2
EJECTION FRACTION APICAL 4 CHAMBER: 65.9
EJECTION FRACTION: 63 %
ERYTHROCYTE [DISTWIDTH] IN BLOOD BY AUTOMATED COUNT: 12.7 % (ref 11.5–14.5)
GLUCOSE BLD MANUAL STRIP-MCNC: 124 MG/DL (ref 74–99)
GLUCOSE BLD MANUAL STRIP-MCNC: 166 MG/DL (ref 74–99)
GLUCOSE SERPL-MCNC: 138 MG/DL (ref 74–99)
HCT VFR BLD AUTO: 45.1 % (ref 36–46)
HGB BLD-MCNC: 14.9 G/DL (ref 12–16)
LEFT ATRIUM VOLUME AREA LENGTH INDEX BSA: 18 ML/M2
LEFT VENTRICLE INTERNAL DIMENSION DIASTOLE: 3.93 CM (ref 3.5–6)
LEFT VENTRICULAR OUTFLOW TRACT DIAMETER: 1.97 CM
MCH RBC QN AUTO: 28.8 PG (ref 26–34)
MCHC RBC AUTO-ENTMCNC: 33 G/DL (ref 32–36)
MCV RBC AUTO: 87 FL (ref 80–100)
MITRAL VALVE E/A RATIO: 0.68
NRBC BLD-RTO: 0 /100 WBCS (ref 0–0)
PLATELET # BLD AUTO: 294 X10*3/UL (ref 150–450)
POTASSIUM SERPL-SCNC: 4 MMOL/L (ref 3.5–5.3)
RBC # BLD AUTO: 5.17 X10*6/UL (ref 4–5.2)
RIGHT VENTRICLE FREE WALL PEAK S': 14 CM/S
SODIUM SERPL-SCNC: 136 MMOL/L (ref 136–145)
TRICUSPID ANNULAR PLANE SYSTOLIC EXCURSION: 1.8 CM
WBC # BLD AUTO: 7.4 X10*3/UL (ref 4.4–11.3)

## 2024-08-14 PROCEDURE — 80048 BASIC METABOLIC PNL TOTAL CA: CPT | Performed by: STUDENT IN AN ORGANIZED HEALTH CARE EDUCATION/TRAINING PROGRAM

## 2024-08-14 PROCEDURE — 93306 TTE W/DOPPLER COMPLETE: CPT

## 2024-08-14 PROCEDURE — RXMED WILLOW AMBULATORY MEDICATION CHARGE

## 2024-08-14 PROCEDURE — 93306 TTE W/DOPPLER COMPLETE: CPT | Performed by: STUDENT IN AN ORGANIZED HEALTH CARE EDUCATION/TRAINING PROGRAM

## 2024-08-14 PROCEDURE — 97530 THERAPEUTIC ACTIVITIES: CPT | Mod: 59,GP

## 2024-08-14 PROCEDURE — 2500000001 HC RX 250 WO HCPCS SELF ADMINISTERED DRUGS (ALT 637 FOR MEDICARE OP): Performed by: NURSE PRACTITIONER

## 2024-08-14 PROCEDURE — 97161 PT EVAL LOW COMPLEX 20 MIN: CPT | Mod: GP

## 2024-08-14 PROCEDURE — S4991 NICOTINE PATCH NONLEGEND: HCPCS | Performed by: INTERNAL MEDICINE

## 2024-08-14 PROCEDURE — G0378 HOSPITAL OBSERVATION PER HR: HCPCS

## 2024-08-14 PROCEDURE — 2500000005 HC RX 250 GENERAL PHARMACY W/O HCPCS: Performed by: INTERNAL MEDICINE

## 2024-08-14 PROCEDURE — 36415 COLL VENOUS BLD VENIPUNCTURE: CPT | Performed by: STUDENT IN AN ORGANIZED HEALTH CARE EDUCATION/TRAINING PROGRAM

## 2024-08-14 PROCEDURE — 99222 1ST HOSP IP/OBS MODERATE 55: CPT

## 2024-08-14 PROCEDURE — 2500000001 HC RX 250 WO HCPCS SELF ADMINISTERED DRUGS (ALT 637 FOR MEDICARE OP): Performed by: INTERNAL MEDICINE

## 2024-08-14 PROCEDURE — 82947 ASSAY GLUCOSE BLOOD QUANT: CPT | Mod: 59

## 2024-08-14 PROCEDURE — 95992 CANALITH REPOSITIONING PROC: CPT | Mod: GP

## 2024-08-14 PROCEDURE — 93880 EXTRACRANIAL BILAT STUDY: CPT

## 2024-08-14 PROCEDURE — 99239 HOSP IP/OBS DSCHRG MGMT >30: CPT | Performed by: STUDENT IN AN ORGANIZED HEALTH CARE EDUCATION/TRAINING PROGRAM

## 2024-08-14 PROCEDURE — 85027 COMPLETE CBC AUTOMATED: CPT | Performed by: STUDENT IN AN ORGANIZED HEALTH CARE EDUCATION/TRAINING PROGRAM

## 2024-08-14 PROCEDURE — 2500000002 HC RX 250 W HCPCS SELF ADMINISTERED DRUGS (ALT 637 FOR MEDICARE OP, ALT 636 FOR OP/ED): Performed by: INTERNAL MEDICINE

## 2024-08-14 PROCEDURE — 93880 EXTRACRANIAL BILAT STUDY: CPT | Performed by: INTERNAL MEDICINE

## 2024-08-14 RX ORDER — AMLODIPINE BESYLATE 10 MG/1
10 TABLET ORAL DAILY
Qty: 30 TABLET | Refills: 0 | Status: SHIPPED | OUTPATIENT
Start: 2024-08-14 | End: 2024-09-13

## 2024-08-14 RX ORDER — ATORVASTATIN CALCIUM 40 MG/1
40 TABLET, FILM COATED ORAL DAILY
Qty: 30 TABLET | Refills: 0 | Status: SHIPPED | OUTPATIENT
Start: 2024-08-14 | End: 2024-09-13

## 2024-08-14 RX ORDER — CARVEDILOL 6.25 MG/1
6.25 TABLET ORAL 2 TIMES DAILY
Qty: 60 TABLET | Refills: 0 | Status: SHIPPED | OUTPATIENT
Start: 2024-08-14 | End: 2024-09-13

## 2024-08-14 RX ORDER — DULAGLUTIDE 3 MG/.5ML
3 INJECTION, SOLUTION SUBCUTANEOUS
Qty: 2 ML | Refills: 0 | Status: SHIPPED | OUTPATIENT
Start: 2024-08-14 | End: 2024-09-13

## 2024-08-14 RX ORDER — OMEPRAZOLE 40 MG/1
40 CAPSULE, DELAYED RELEASE ORAL DAILY
Qty: 30 CAPSULE | Refills: 0 | Status: SHIPPED | OUTPATIENT
Start: 2024-08-14 | End: 2024-09-13

## 2024-08-14 RX ORDER — CLOPIDOGREL BISULFATE 75 MG/1
75 TABLET ORAL DAILY
Qty: 30 TABLET | Refills: 0 | Status: SHIPPED | OUTPATIENT
Start: 2024-08-14 | End: 2024-09-13

## 2024-08-14 ASSESSMENT — COGNITIVE AND FUNCTIONAL STATUS - GENERAL
MOBILITY SCORE: 20
WALKING IN HOSPITAL ROOM: A LITTLE
DAILY ACTIVITIY SCORE: 22
MOVING FROM LYING ON BACK TO SITTING ON SIDE OF FLAT BED WITH BEDRAILS: A LITTLE
TOILETING: A LITTLE
CLIMB 3 TO 5 STEPS WITH RAILING: A LITTLE
WALKING IN HOSPITAL ROOM: A LITTLE
STANDING UP FROM CHAIR USING ARMS: A LITTLE
PERSONAL GROOMING: A LITTLE
TURNING FROM BACK TO SIDE WHILE IN FLAT BAD: A LITTLE
CLIMB 3 TO 5 STEPS WITH RAILING: A LITTLE
MOBILITY SCORE: 18
MOVING TO AND FROM BED TO CHAIR: A LITTLE
STANDING UP FROM CHAIR USING ARMS: A LITTLE
MOBILITY SCORE: 18
CLIMB 3 TO 5 STEPS WITH RAILING: A LITTLE
STANDING UP FROM CHAIR USING ARMS: A LITTLE
WALKING IN HOSPITAL ROOM: A LITTLE
MOVING FROM LYING ON BACK TO SITTING ON SIDE OF FLAT BED WITH BEDRAILS: A LITTLE
TURNING FROM BACK TO SIDE WHILE IN FLAT BAD: A LITTLE

## 2024-08-14 ASSESSMENT — PAIN SCALES - GENERAL
PAINLEVEL_OUTOF10: 7
PAINLEVEL_OUTOF10: 9
PAINLEVEL_OUTOF10: 0 - NO PAIN
PAINLEVEL_OUTOF10: 3
PAINLEVEL_OUTOF10: 0 - NO PAIN

## 2024-08-14 ASSESSMENT — PAIN DESCRIPTION - ORIENTATION: ORIENTATION: LOWER

## 2024-08-14 ASSESSMENT — PAIN - FUNCTIONAL ASSESSMENT
PAIN_FUNCTIONAL_ASSESSMENT: 0-10

## 2024-08-14 ASSESSMENT — PAIN SCALES - PAIN ASSESSMENT IN ADVANCED DEMENTIA (PAINAD): TOTALSCORE: MEDICATION (SEE MAR)

## 2024-08-14 ASSESSMENT — PAIN DESCRIPTION - LOCATION
LOCATION: BACK
LOCATION: HEAD

## 2024-08-14 NOTE — PROGRESS NOTES
Physical Therapy    Physical Therapy Treatment    Patient Name: Esme Hicks  MRN: [id number]  Today's Date: 8/14/2024  Time Calculation  Start Time: 1358  Stop Time: 1428  Time Calculation (min): 30 min    Assessment/Plan   PT Assessment  PT Assessment Results: Decreased mobility, Impaired balance (+BPPV)  Rehab Prognosis: Good  Barriers to Discharge: None  Evaluation/Treatment Tolerance: Patient tolerated treatment well  Medical Staff Made Aware: Yes  End of Session Communication: Bedside nurse, Physician  End of Session Patient Position: Up in chair, Alarm off, not on at start of session (communicated with RN at end of session)       Assessment:   Pt is a 65 y/o female presenting for vestibular PT assessment at the request of MD and evaluating PT. (+) Glynn-Hallpike noted on LEFT ear for upbeating, torsional nystagmus and sx, indicating posterior canal BPPV. Initiated prompt treatment by performing Epley Maneuver x2. During second maneuver, pt reported decreased sx and slower paced nystagmus was observed in 2 of the 3 positions. Instructed pt to remain in upright sitting position for at least 2 hours, if not longer, to allow BPPV to stabilize. Recommend outpatient vestibular PT if sx reoccur.     Will follow-up with pt tomorrow to reassess sx if staying overnight.     PT Plan  Treatment/Interventions: Bed mobility, Transfer training, Gait training, Strengthening, Therapeutic exercise  PT Plan: Ongoing PT  PT Frequency: 3 times per week  PT Discharge Recommendations: Other (comment) (outpatient BPPV rehab)  Equipment Recommended upon Discharge: Wheeled walker  PT Recommended Transfer Status: Assist x1  PT - OK to Discharge: Yes (outpatient BPPV rehab)      General Visit Information:   PT  Visit  PT Received On: 08/14/24  Response to Previous Treatment: Patient with no complaints from previous session.  General  Reason for Referral: Vestibular assessment  Referred By: Mehul Francisco MD  Past Medical History Relevant to  "Rehab: HTN, 2 prior strokes without significant deficits, DM, +smoker  Family/Caregiver Present: No  Prior to Session Communication: Bedside nurse, Physician  Patient Position Received: Bed, 3 rail up, Alarm off, not on at start of session  General Comment: Pt pleasantly agreeable to vestibular assessment. Pt reports 1st episode occurred a few weeks ago while driving. Pt pulled over and noted nystagmus. At that time, sx resolved with rest. 2nd episode occurred while pt was at laundromat. States \"I turned my head right, the room started spinning, so I tried to go sit down in the bathroom.\" +LOC. +Head strike (posteriorly).    Subjective   Precautions:  Precautions  Medical Precautions: Fall precautions  Vital Signs:  Vital Signs  Heart Rate: 69  Heart Rate Source: Monitor  Patient Position: Sitting (EOB)    Objective   Pain:  Pain Assessment  Pain Assessment: 0-10  0-10 (Numeric) Pain Score: 0 - No pain  Pain Interventions: Repositioned  Response to Interventions: Remained pain-free during session    Postural Control:  Static Sitting Balance  Static Sitting-Balance Support: Feet supported, No upper extremity supported  Static Sitting-Level of Assistance: Distant supervision  Static Standing Balance  Static Standing-Balance Support: Bilateral upper extremity supported  Static Standing-Level of Assistance: Contact guard  Static Standing-Comment/Number of Minutes: CGA for safety due to sx of BPPV    Treatments:  Bed Mobility  Bed Mobility: Yes  Bed Mobility 1  Bed Mobility 1: Supine to sitting  Level of Assistance 1: Modified independent  Bed Mobility 2  Bed Mobility  2: Rolling right  Level of Assistance 2: Contact guard  Bed Mobility Comments 2: during Epley maneuver  Bed Mobility 3  Bed Mobility 3: Side lying right to sit  Level of Assistance 3: Contact guard  Bed Mobility Comments 3: during Epley maneuver    Ambulation/Gait Training  Ambulation/Gait Training Performed: Yes  Ambulation/Gait Training 1  Device 1: " Rolling walker  Assistance 1: Close supervision  Quality of Gait 1: Decreased step length  Comments/Distance (ft) 1: 15' to chair at end of session; VC for ww use and safety when   navigating around bed; CGA due to sx of BPPV    Transfers  Transfer: Yes  Transfer 1  Technique 1: Sit to stand, Stand to sit  Transfer Device 1: Walker  Transfer Level of Assistance 1: Close supervision  Trials/Comments 1: VC for hand placement, slow transitions due to dizziness    (+) Glynn-Hallpike on LEFT ear     Other Activity  Other Activity Performed: Yes  Other Activity 1: Epley maneuver for LEFT ear x2    Outcome Measures:  [facility] Basic Mobility  Turning from your back to your side while in a flat bed without using bedrails: A little  Moving from lying on your back to sitting on the side of a flat bed without using bedrails: A little  Moving to and from bed to chair (including a wheelchair): A little  Standing up from a chair using your arms (e.g. wheelchair or bedside chair): A little  To walk in hospital room: A little  Climbing 3-5 steps with railing: A little  Basic Mobility - Total Score: 18    Education Documentation  Precautions, taught by Vika Duran, PT at 8/14/2024  4:45 PM.  Learner: Patient  Readiness: Eager  Method: Explanation, Demonstration, Handout  Response: Verbalizes Understanding, Demonstrated Understanding  Comment: vestibular HEP provided by MD    Body Mechanics, taught by Vika Duran, PT at 8/14/2024  4:45 PM.  Learner: Patient  Readiness: Eager  Method: Explanation, Demonstration, Handout  Response: Verbalizes Understanding, Demonstrated Understanding  Comment: vestibular HEP provided by MD    Mobility Training, taught by Vika Duran, PT at 8/14/2024  4:45 PM.  Learner: Patient  Readiness: Eager  Method: Explanation, Demonstration, Handout  Response: Verbalizes Understanding, Demonstrated Understanding  Comment: vestibular HEP provided by MD    Education Comments  BPPV assessment and  treatment         OP EDUCATION:       Encounter Problems       Encounter Problems (Active)       Mobility       STG - Patient will ambulate 100 plus feet independently no device (Progressing)       Start:  08/14/24    Expected End:  08/28/24               PT Transfers       STG - Patient to transfer to and from sit to supine independently (Progressing)       Start:  08/14/24    Expected End:  08/28/24            STG - Patient will transfer sit to and from stand independently no device (Progressing)       Start:  08/14/24    Expected End:  08/28/24               Pain - Adult             Encounter Problems (Resolved)       Balance       STG - Maintains static sitting balance without upper extremity support 3-5 minutes independently (Met)       Start:  08/14/24    Expected End:  08/28/24    Resolved:  08/14/24    INTERVENTIONS:  1. Practice sitting on the edge of a bed/mat with minimal support.  2. Educate patient about maintining total hip precautions while maintaining balance.  3. Educate patient about pressure relief.  4. Educate patient about use of assistive device.

## 2024-08-14 NOTE — CONSULTS
Inpatient consult to Cardiology  Consult performed by: Axel Feliciano MD  Consult ordered by: Mehul Francisco MD          Reason For Consult  Syncope    History Of Present Illness  Esme Hicks is a 64 y.o. female with PMH  of DM, PAD, and TIA presenting after a syncopal episode. Patient stated that she was doing her laundry at a laundromat when suddenly she started feeling very hot and sweating. She stated that the heat was traveling up into her neck and she had a shoulder pain. She shortly after felt dizzy and went to the restroom to get a cold rag, when she passed out and hit her head. Cardiology consulted for syncope    Patient was transported via EMS to [facility] ED where they stapled her head. CT head showed no acute process. ED labs showed WBC of 14.3 and Cr 1.17 EGFR 52.    Today the patient is feeling better. She admitted to bounds of dizziness and vertigo. She admitted to headache that she linked to the fall and blurry vision. She denied chest pain, sob.      Past Medical History  She has no past medical history on file.    Surgical History  She has no past surgical history on file.     Social History  She reports that she has been smoking cigarettes. She has never used smokeless tobacco. She reports that she does not currently use alcohol. She reports that she does not use drugs.    Family History  No family history on file.     Allergies  Percocet [oxycodone-acetaminophen] and Succinylcholine    Review of Systems   All other systems reviewed and are negative.       Physical Exam  Constitutional:       Appearance: Normal appearance.   HENT:      Head: Normocephalic.      Nose: Nose normal.   Eyes:      Conjunctiva/sclera: Conjunctivae normal.   Cardiovascular:      Rate and Rhythm: Normal rate and regular rhythm.      Pulses: Normal pulses.      Heart sounds: Normal heart sounds.   Pulmonary:      Effort: Pulmonary effort is normal.      Breath sounds: Normal breath sounds.   Abdominal:      General: Abdomen  is flat. Bowel sounds are normal.   Musculoskeletal:         General: Swelling present.      Cervical back: Normal range of motion.      Comments: BLE trace swelling   Skin:     General: Skin is warm.   Neurological:      General: No focal deficit present.      Mental Status: She is alert and oriented to person, place, and time.   Psychiatric:         Mood and Affect: Mood normal.         Behavior: Behavior normal.        Scheduled medications  amLODIPine, 10 mg, oral, Daily  atorvastatin, 40 mg, oral, Daily  carvedilol, 6.25 mg, oral, BID  clopidogrel, 75 mg, oral, Daily  diph,pertuss(acel),tet vac (PF), 0.5 mL, intramuscular, Once  docusate sodium, 100 mg, oral, BID  enoxaparin, 40 mg, subcutaneous, q24h  valsartan, 320 mg, oral, Daily   And  hydroCHLOROthiazide, 25 mg, oral, Daily  insulin lispro, 0-10 Units, subcutaneous, 4x daily  lidocaine, 1 patch, transdermal, Daily  nicotine, 1 patch, transdermal, q24h  nitrofurantoin (macrocrystal-monohydrate), 100 mg, oral, BID  pantoprazole, 40 mg, oral, Daily before breakfast  sennosides, 1 tablet, oral, BID  varenicline, 1 mg, oral, BID      Continuous medications  sodium chloride 0.9%, 100 mL/hr, Last Rate: Stopped (08/13/24 2116)      PRN medications  PRN medications: acetaminophen **OR** acetaminophen **OR** acetaminophen, dextrose, dextrose, glucagon, glucagon, hydrALAZINE, ipratropium-albuteroL, melatonin, methocarbamol, nitroglycerin    Last Recorded Vitals  Blood pressure 161/81, pulse 64, temperature 36.4 °C (97.5 °F), temperature source Temporal, resp. rate 18, height 1.524 m (5'), weight 64.7 kg (142 lb 11.2 oz), SpO2 95%.    Relevant Results  Results for orders placed or performed during the hospital encounter of 08/12/24 (from the past 24 hour(s))   POCT GLUCOSE   Result Value Ref Range    POCT Glucose 111 (H) 74 - 99 mg/dL   POCT GLUCOSE   Result Value Ref Range    POCT Glucose 151 (H) 74 - 99 mg/dL   CBC   Result Value Ref Range    WBC 7.4 4.4 - 11.3  x10*3/uL    nRBC 0.0 0.0 - 0.0 /100 WBCs    RBC 5.17 4.00 - 5.20 x10*6/uL    Hemoglobin 14.9 12.0 - 16.0 g/dL    Hematocrit 45.1 36.0 - 46.0 %    MCV 87 80 - 100 fL    MCH 28.8 26.0 - 34.0 pg    MCHC 33.0 32.0 - 36.0 g/dL    RDW 12.7 11.5 - 14.5 %    Platelets 294 150 - 450 x10*3/uL   Basic metabolic panel   Result Value Ref Range    Glucose 138 (H) 74 - 99 mg/dL    Sodium 136 136 - 145 mmol/L    Potassium 4.0 3.5 - 5.3 mmol/L    Chloride 102 98 - 107 mmol/L    Bicarbonate 25 21 - 32 mmol/L    Anion Gap 13 10 - 20 mmol/L    Urea Nitrogen 18 6 - 23 mg/dL    Creatinine 0.71 0.50 - 1.05 mg/dL    eGFR >90 >60 mL/min/1.73m*2    Calcium 9.2 8.6 - 10.3 mg/dL   POCT GLUCOSE   Result Value Ref Range    POCT Glucose 166 (H) 74 - 99 mg/dL   POCT GLUCOSE   Result Value Ref Range    POCT Glucose 124 (H) 74 - 99 mg/dL     ECG 12 lead    Result Date: 8/13/2024  Normal sinus rhythm Possible Left atrial enlargement Septal infarct , age undetermined Abnormal ECG No previous ECGs available See ED provider note for full interpretation and clinical correlation Confirmed by Vika Cote ([id number]) on 8/13/2024 6:07:08 PM    XR chest 1 view    Result Date: 8/12/2024  Interpreted By:  Archana Winkler, STUDY: XR CHEST 1 VIEW;  8/12/2024 5:05 pm   INDICATION: Signs/Symptoms:eval PNA.   COMPARISON: Chest x-ray 04/13/2021.   ACCESSION NUMBER(S): [id number]   ORDERING CLINICIAN: KEVIN WAYNE   FINDINGS: AP radiograph of the chest was provided.       CARDIOMEDIASTINAL SILHOUETTE: Cardiomediastinal silhouette is normal in size and configuration. Thoracic aortic atherosclerotic calcifications noted.   LUNGS: Mild streaky linear areas of atelectasis in the left lung base. No focal consolidation or airspace opacity. No pleural effusion or pneumothorax.   ABDOMEN: No remarkable upper abdominal findings.   BONES: No acute osseous changes.       1.  No evidence of acute cardiopulmonary process.       MACRO: None   Signed by: Archana Winkler  8/12/2024 5:19 PM Dictation workstation:   [id number]    CT cervical spine wo IV contrast    Result Date: 8/12/2024  Interpreted By:  Joey Beckwith, STUDY: CT CERVICAL SPINE WO IV CONTRAST;  8/12/2024 3:27 pm   INDICATION: Signs/Symptoms:head injury.   COMPARISON: None.   ACCESSION NUMBER(S): [id number]   ORDERING CLINICIAN: KEVIN WAYNE   TECHNIQUE: Axial CT images of the cervical spine are obtained. Axial, coronal and sagittal reconstructions are provided for review.   FINDINGS:     Fractures: There is no evidence for an acute fracture of the cervical spine.   Vertebral Alignment: Within normal limits.   Craniocervical Junction: The odontoid process and craniocervical junction are intact.   Vertebrae/Disc Spaces:  The cervical vertebral body heights are intact and the disc spaces are preserved.   Prevertebral/Paraspinal Soft Tissues: The prevertebral and paraspinal soft tissues are unremarkable.   The included lung apices are hyperinflated.       No evidence for an acute fracture or subluxation of the cervical spine.   MACRO: None   Signed by: Joey Beckwith 8/12/2024 3:51 PM Dictation workstation:   [id number]    CT head wo IV contrast    Result Date: 8/12/2024  Interpreted By:  Joey Beckwith, STUDY: CT HEAD WO IV CONTRAST;  8/12/2024 3:27 pm   INDICATION: Signs/Symptoms:head injury on AC.   COMPARISON: None.   ACCESSION NUMBER(S): [id number]   ORDERING CLINICIAN: KEVIN WAYNE   TECHNIQUE: Noncontrast axial CT scan of head was performed. Angled reformats in brain and bone windows were generated. The images were reviewed in bone, brain, blood and soft tissue windows.   FINDINGS: CSF Spaces: The ventricles, sulci and basal cisterns are within normal limits. There is no extraaxial fluid collection.   Parenchyma: Hypodensity in the right posterior frontal lobe likely related to old infarct and slightly more pronounced since the prior exam. The grey-white differentiation is intact. There is no mass effect  or midline shift.  There is no intracranial hemorrhage.   Calvarium: The calvarium is unremarkable.   Paranasal sinuses and mastoids: Visualized paranasal sinuses and mastoids are clear.       No evidence of acute cortical infarct or intracranial hemorrhage.   Chronic changes as described.   MACRO: None   Signed by: Joey Beckwith 8/12/2024 3:40 PM Dictation workstation:   [id number]       Assessment/Plan     Esme Hicks is a 64 y.o. female presenting with Syncope and collapse.    1- Syncope in the setting of UTI and SUDHAKAR  - Ordered Carotid US  - Hold hydrochlorothiazide   - Follow up with her cardiologist, might benefit from a monitor  - Orthostatic vitals were negative.    Will sign off.  Thank you for the consult.    Axel Feliciano M.D.   Internal Medicine, PGY 1

## 2024-08-14 NOTE — PROGRESS NOTES
Esme Green is a 64 y.o. female on day 0 of admission presenting with Benign paroxysmal positional vertigo of right ear.      Subjective   Continues to have vertigo with movements. No CP or SOB.        Objective     Last Recorded Vitals  /68 (BP Location: Right arm, Patient Position: Sitting)   Pulse 75   Temp 36.3 °C (97.3 °F) (Temporal)   Resp 18   Wt 64.7 kg (142 lb 11.2 oz)   SpO2 94%   Intake/Output last 3 Shifts:    Intake/Output Summary (Last 24 hours) at 8/14/2024 1534  Last data filed at 8/13/2024 1929  Gross per 24 hour   Intake 240 ml   Output --   Net 240 ml       Admission Weight  Weight: 69.4 kg (153 lb) (08/12/24 1508)    Daily Weight  08/12/24 : 64.7 kg (142 lb 11.2 oz)    Image Results  Transthoracic Echo (TTE) Complete     [facility], [address] [place] [address]                Tel [phone] and Fax [phone]    TRANSTHORACIC ECHOCARDIOGRAM REPORT       Patient Name:      ESME GREEN      Reading Physician:    [id number] Branden Zuniga MD  Study Date:        8/14/2024            Ordering Provider:    [id number] MICKIE ROWLAND  MRN/PID:           [id number]             Fellow:  Accession#:        [id number]         Nurse:  Date of Birth/Age: 1960 / 64 years Sonographer:          Gretel Harris RDCS  Gender:            F                    Additional Staff:  Height:            152.40 cm            Admit Date:           8/12/2024  Weight:            64.41 kg             Admission Status:     Inpatient -                                                                Routine  BSA / BMI:         1.61 m2 / 27.73      Encounter#:           [id number]                     kg/m2  Blood Pressure:    161/81 mmHg          Department Location:  [facility]                                                                 Invasive    Study Type:    TRANSTHORACIC ECHO (TTE) COMPLETE  Diagnosis/ICD: Syncope and collapse-R55  Indication:    Syncope  CPT Code:      Echo Complete w Full Doppler-92436    Patient History:  Smoker:            Current.  Diabetes:          Yes  Pertinent History: Syncope and COPD.    Study Detail: The following Echo studies were performed: 2D, M-Mode, Doppler and                color flow.       PHYSICIAN INTERPRETATION:  Left Ventricle: The left ventricular systolic function is normal, with a Red's biplane calculated ejection fraction of 63%. There are no regional left ventricular wall motion abnormalities. The left ventricular cavity size is normal. There is mild to moderate concentric left ventricular hypertrophy. Spectral Doppler shows an impaired relaxation pattern of left ventricular diastolic filling.  Left Atrium: The left atrium is normal in size.  Right Ventricle: The right ventricle is normal in size. There is normal right ventricular global systolic function.  Right Atrium: The right atrium is normal in size.  Aortic Valve: The aortic valve is trileaflet. There is minimal aortic valve cusp calcification. The aortic valve dimensionless index is 0.80. There is no evidence of aortic valve regurgitation. The peak instantaneous gradient of the aortic valve is 5.8 mmHg. The mean gradient of the aortic valve is 3.5 mmHg.  Mitral Valve: The mitral valve is mildly thickened. There is trace mitral valve regurgitation.  Tricuspid Valve: The tricuspid valve is structurally normal. There is trace tricuspid regurgitation. The right ventricular systolic pressure is unable to be estimated.  Pulmonic Valve: The pulmonic valve is structurally normal. There is mild pulmonic valve regurgitation.  Pericardium: There is a trivial pericardial effusion.  Aorta: The aortic root is normal.  Systemic Veins: The inferior vena cava appears to be of normal size. There is IVC  inspiratory collapse greater than 50%.  In comparison to the previous echocardiogram(s): There are no prior studies on this patient for comparison purposes.       CONCLUSIONS:   1. The left ventricular systolic function is normal, with a Red's biplane calculated ejection fraction of 63%.   2. Spectral Doppler shows an impaired relaxation pattern of left ventricular diastolic filling.   3. There is normal right ventricular global systolic function.    QUANTITATIVE DATA SUMMARY:  2D MEASUREMENTS:                          Normal Ranges:  IVSd:          1.38 cm  (0.6-1.1cm)  LVPWd:         1.27 cm  (0.6-1.1cm)  LVIDd:         3.93 cm  (3.9-5.9cm)  LVIDs:         2.57 cm  LV Mass Index: 116 g/m2  LVEDV Index:   33 ml/m2  LV % FS        34.6 %    LA VOLUME:                                Normal Ranges:  LA Vol A4C:        25.4 ml    (22+/-6mL/m2)  LA Vol A2C:        31.2 ml  LA Vol BP:         29.1 ml  LA Vol Index A4C:  15.7 ml/m2  LA Vol Index A2C:  19.4 ml/m2  LA Vol Index BP:   18.0 ml/m2  LA Area A4C:       12.1 cm2  LA Area A2C:       13.0 cm2  LA Major Axis A4C: 4.9 cm  LA Major Axis A2C: 4.6 cm  LA Volume Index:   18.1 ml/m2  LA Vol A4C:        22.8 ml  LA Vol A2C:        29.7 ml  LA Vol Index BSA:  16.3 ml/m2    RA VOLUME BY A/L METHOD:                        Normal Ranges:  RA Area A4C: 11.2 cm2    M-MODE MEASUREMENTS:                   Normal Ranges:  Ao Root: 2.80 cm (2.0-3.7cm)  LAs:     3.02 cm (2.7-4.0cm)    LV SYSTOLIC FUNCTION BY 2D PLANIMETRY (MOD):                       Normal Ranges:  EF-A4C View:    66 % (>=55%)  EF-A2C View:    62 %  EF-Biplane:     63 %  LV EF Reported: 63 %    LV DIASTOLIC FUNCTION:                            Normal Ranges:  MV Peak E:    0.58 m/s    (0.7-1.2 m/s)  MV Peak A:    0.85 m/s    (0.42-0.7 m/s)  E/A Ratio:    0.68        (1.0-2.2)  MV e'         0.040 m/s   (>8.0)  MV lateral e' 0.04 m/s  MV medial e'  0.04 m/s  MV A Dur:     126.87 msec  E/e' Ratio:   14.44        (<8.0)    MITRAL VALVE:                  Normal Ranges:  MV DT: 332 msec (150-240msec)    AORTIC VALVE:                                    Normal Ranges:  AoV Vmax:                1.21 m/s (<=1.7m/s)  AoV Peak P.8 mmHg (<20mmHg)  AoV Mean PG:             3.5 mmHg (1.7-11.5mmHg)  LVOT Max Sander:            0.85 m/s (<=1.1m/s)  AoV VTI:                 25.35 cm (18-25cm)  LVOT VTI:                20.22 cm  LVOT Diameter:           1.97 cm  (1.8-2.4cm)  AoV Area, VTI:           2.43 cm2 (2.5-5.5cm2)  AoV Area,Vmax:           2.15 cm2 (2.5-4.5cm2)  AoV Dimensionless Index: 0.80       RIGHT VENTRICLE:  RV Basal 3.40 cm  RV Mid   2.20 cm  RV Major 7.2 cm  TAPSE:   18.0 mm  RV s'    0.14 m/s    TRICUSPID VALVE/RVSP:                    Normal Ranges:  IVC Diam: 1.50 cm    PULMONIC VALVE:                       Normal Ranges:  PV Max Sander: 0.9 m/s  (0.6-0.9m/s)  PV Max PG:  3.3 mmHg       [id number] Branden Zuniga MD  Electronically signed on 2024 at 3:12:53 PM       ** Final **  Vascular US Carotid Artery Duplex Bilateral  Preliminary Cardiology Report            [place] [address]   Tel [phone] and Fax [phone]           Preliminary Vascular Lab Report     [facility] US CAROTID ARTERY DUPLEX BILATERAL       Patient Name:      ALEXANDRA Acosta Physician:  [id number] Randa Ruano MD  Study Date:        2024       Ordering Physician: [id number] TOLEDO  MRN/PID:           [id number]        Technologist:       Liane Edmondson RVPOPPY  Accession#:        [id number]    Technologist 2:  Date of Birth/Age: 1960       Encounter#:         [id number]  Gender:            F  Admission Status:  Inpatient       Location Performed: [facility]       Diagnosis/ICD: Syncope and collapse-R55  Procedure/CPT: 03968 Cerebrovascular Carotid Duplex scan complete       PRELIMINARY CONCLUSIONS:  Right Carotid: Findings are consistent with less than 50% stenosis of the right  proximal internal carotid artery. Laminar flow seen by color Doppler. Right external carotid artery appears patent with no evidence of stenosis. The right vertebral artery is patent with antegrade flow. No evidence of hemodynamically significant stenosis in the right subclavian artery.  Left Carotid: Findings are consistent with less than 50% stenosis of the left proximal internal carotid artery. Laminar flow seen by color Doppler. Left external carotid artery appears patent with no evidence of stenosis. The left vertebral artery pre-steal. Dampened waveforms in left subclavian noted, may be indictative to a more proximal stenosis.     Imaging & Doppler Findings:  Right Plaque Morph: The proximal right internal carotid artery demonstrates heterogenous and calcified plaque. The proximal right external carotid artery demonstrates heterogenous and calcified plaque. The distal right common carotid artery demonstrates heterogenous and calcified plaque.  Left Plaque Morph: The proximal left internal carotid artery demonstrates heterogenous and calcified plaque. The proximal left external carotid artery demonstrates heterogenous and calcified plaque. The mid left common carotid artery demonstrates heterogenous and calcified plaque. The distal left common carotid artery demonstrates heterogenous and calcified plaque.      Right                        Left    PSV      EDV                PSV      EDV  108 cm/s           CCA P    103 cm/s  111 cm/s           CCA D    148 cm/s  69 cm/s  18 cm/s   ICA P    133 cm/s 24 cm/s  68 cm/s            ICA M    72 cm/s  86 cm/s            ICA D    82 cm/s  185 cm/s            ECA     220 cm/s  69 cm/s          Vertebral  201 cm/s         Subclavian 97 cm/s                     Right Left  ICA/CCA Ratio  0.6  0.9            VASCULAR PRELIMINARY REPORT  completed by Liane Edmondson POPPY on 8/14/2024 at 12:46:44 PM       ** Final **      Physical Exam  Constitutional:       Appearance: Normal  appearance.   HENT:      Mouth/Throat:      Mouth: Mucous membranes are moist.   Cardiovascular:      Rate and Rhythm: Normal rate and regular rhythm.      Heart sounds: No murmur heard.  Pulmonary:      Effort: Pulmonary effort is normal.      Breath sounds: Normal breath sounds.   Abdominal:      General: Abdomen is flat. Bowel sounds are normal.      Palpations: Abdomen is soft.   Neurological:      Mental Status: She is alert.      Comments: Positive [place] Hallpike maneuver to the left         Relevant Results           Scheduled medications  amLODIPine, 10 mg, oral, Daily  atorvastatin, 40 mg, oral, Daily  carvedilol, 6.25 mg, oral, BID  clopidogrel, 75 mg, oral, Daily  diph,pertuss(acel),tet vac (PF), 0.5 mL, intramuscular, Once  docusate sodium, 100 mg, oral, BID  enoxaparin, 40 mg, subcutaneous, q24h  valsartan, 320 mg, oral, Daily   And  hydroCHLOROthiazide, 25 mg, oral, Daily  insulin lispro, 0-10 Units, subcutaneous, 4x daily  lidocaine, 1 patch, transdermal, Daily  nicotine, 1 patch, transdermal, q24h  nitrofurantoin (macrocrystal-monohydrate), 100 mg, oral, BID  pantoprazole, 40 mg, oral, Daily before breakfast  sennosides, 1 tablet, oral, BID  varenicline, 1 mg, oral, BID      Continuous medications  sodium chloride 0.9%, 100 mL/hr, Last Rate: Stopped (08/13/24 2116)      PRN medications  PRN medications: acetaminophen **OR** acetaminophen **OR** acetaminophen, dextrose, dextrose, glucagon, glucagon, hydrALAZINE, ipratropium-albuteroL, melatonin, methocarbamol, nitroglycerin      Assessment/Plan          Assessment & Plan  Benign paroxysmal positional vertigo of right ear    Other specified diabetes mellitus with other specified complication (Multi)    BPPV of the right ear  Patient did have relief with Eply maneuver done by PT  -OP vestibular pt referral  -Home vestibular maneuver print out to be given at [facility]    Syncope and collapse  -Telemetry (no events while one)  -Echo WNL  -Cards consulted  -Treated  UTI as below  -Orthostats neg     Fall with head injury  -2/2 above  -Stabled per ED  -Tetanus booster given in ED  -Supportive care     Acute cystitis  -s/p  Nitrofurantoin     HTN  -Off home medications and resumed as above per cards     Tobacco use  -Nicotine patch and vareniciline     T2DM  -Truliciy at home (off many months)  -Accu checks and ISS  -A1c 6.1      Dispo: home today if we can get meds           MD Mehul López MD

## 2024-08-14 NOTE — PROGRESS NOTES
Physical Therapy    Physical Therapy Evaluation    Patient Name: Esme Hicks  MRN: [id number]  Today's Date: 8/14/2024   Time Calculation  Start Time: 1047  Stop Time: 1100  Time Calculation (min): 13 min    Assessment/Plan   PT Assessment  PT Assessment Results: Decreased mobility  Rehab Prognosis: Good  Barriers to Discharge:  (none)  Evaluation/Treatment Tolerance: Patient limited by fatigue  Medical Staff Made Aware: Yes  Strengths: Ability to acquire knowledge  Barriers to Participation: Comorbidities  End of Session Communication: Bedside nurse  End of Session Patient Position: Bed, 3 rail up, Alarm on  IP OR SWING BED PT PLAN  Inpatient or Swing Bed: Inpatient  PT Plan  Treatment/Interventions: Bed mobility, Transfer training, Gait training, Strengthening, Therapeutic exercise  PT Plan: Ongoing PT  PT Frequency: 3 times per week  PT Discharge Recommendations: Other (comment) (outpatient BPPV rehab)  Equipment Recommended upon Discharge: Wheeled walker  PT Recommended Transfer Status: Assist x1  PT - OK to Discharge: Yes (outpatient BPPV rehab)      Subjective   General Visit Information:  General  Reason for Referral:  (65 yo female admitted 2' to syncopal episode, head injury)  Referred By:  (Dr. KVNG Francisco)  Family/Caregiver Present: Yes (spouse)  Prior to Session Communication: Bedside nurse  Patient Position Received: Bed, 3 rail up, Alarm off, not on at start of session  General Comment:  (pt is pleasant and agreeable to work with PT. Pt is moving cautiously but gave good effort. Recommend outpatient BBPV rehab upopn D/C)  Home Living:  Home Living  Type of Home: House  Lives With: Spouse  Home Adaptive Equipment: Walker rolling or standard  Home Layout: One level  Home Access: Stairs to enter without rails  Entrance Stairs-Rails: None  Entrance Stairs-Number of Steps:  (2)  Bathroom Shower/Tub: Tub/shower unit  Bathroom Toilet: Standard  Bathroom Equipment: Tub transfer bench  Bathroom Accessibility:   (fully accessible)  Prior Level of Function:  Prior Function Per Pt/Caregiver Report  Level of [place]: Independent with ADLs and functional transfers, Independent with homemaking with ambulation (no device)  Ambulatory Assistance: Independent  Precautions:  Precautions  Medical Precautions: Fall precautions  Vital Signs:       Objective   Pain:  Pain Assessment  Pain Assessment: 0-10  0-10 (Numeric) Pain Score: 0 - No pain  Cognition:  Cognition  Overall Cognitive Status: Within Functional Limits    General Assessments:  General Observation  General Observation:  (Pt gave good effort, she never had any loss of balance)               Activity Tolerance  Endurance: Tolerates 10 - 20 min exercise with multiple rests         Strength  Strength Comments:  (B UE and LE are grossly 4+ of 5)  Static Sitting Balance  Static Sitting-Balance Support: Bilateral upper extremity supported  Static Sitting-Level of Assistance: Distant supervision    Static Standing Balance  Static Standing-Balance Support: No upper extremity supported  Static Standing-Level of Assistance: Close supervision  Dynamic Standing Balance  Dynamic Standing-Balance Support: No upper extremity supported  Dynamic Standing-Level of Assistance: Close supervision  Functional Assessments:  Bed Mobility  Bed Mobility: Yes  Bed Mobility 1  Bed Mobility 1: Supine to sitting, Sitting to supine  Level of Assistance 1: Close supervision    Transfers  Transfer: Yes  Transfer 1  Transfer From 1: Bed to  Transfer to 1: Stand  Technique 1: Sit to stand, Stand to sit  Transfer Device 1:  (no device)  Transfer Level of Assistance 1: Close supervision    Ambulation/Gait Training  Ambulation/Gait Training Performed: Yes  Ambulation/Gait Training 1  Surface 1: Level tile  Device 1: No device  Assistance 1: Close supervision  Quality of Gait 1: Decreased step length  Comments/Distance (ft) 1:  (side stepping down up EOB and 5 feet forward/backward x 5. Pt started feeling  dizzy after last forward/backward trial)    Stairs  Stairs: No  Extremity/Trunk Assessments:  RUE   RUE : Within Functional Limits  LUE   LUE: Within Functional Limits  RLE   RLE : Within Functional Limits  LLE   LLE : Within Functional Limits  Outcome Measures:  [facility] Basic Mobility  Turning from your back to your side while in a flat bed without using bedrails: A little  Moving from lying on your back to sitting on the side of a flat bed without using bedrails: A little  Moving to and from bed to chair (including a wheelchair): A little  Standing up from a chair using your arms (e.g. wheelchair or bedside chair): A little  To walk in hospital room: A little  Climbing 3-5 steps with railing: A little  Basic Mobility - Total Score: 18    Encounter Problems       Encounter Problems (Active)       Balance       STG - Maintains static sitting balance without upper extremity support 3-5 minutes independently (Progressing)                       Mobility       STG - Patient will ambulate 100 plus feet independently no device (Progressing)       Start:  08/14/24    Expected End:  08/28/24               PT Transfers       STG - Patient to transfer to and from sit to supine independently (Progressing)       Start:  08/14/24    Expected End:  08/28/24            STG - Patient will transfer sit to and from stand independently no device (Progressing)       Start:  08/14/24    Expected End:  08/28/24               Pain - Adult              Education Documentation  No documentation found.  Education Comments  No comments found.

## 2024-08-14 NOTE — PROGRESS NOTES
08/14/24 1305   Discharge Planning   Living Arrangements Spouse/significant other   Support Systems Spouse/significant other   Assistance Needed Alert and oriented x3,  Independent with ADL's, Walker PRN, Drives. Patient was seen by SW on 8/13/24 and concerns were discussed   Type of Residence Private residence   Who is requesting discharge planning? Provider   Home or Post Acute Services None   Expected Discharge Disposition Home   Does the patient need discharge transport arranged? No   Patient Choice   Provider Choice list and CMS website (https://medicare.gov/care-compare#search) for post-acute Quality and Resource Measure Data were provided and reviewed with: Patient   Patient / Family choosing to utilize agency / facility established prior to hospitalization No

## 2024-08-14 NOTE — NURSING NOTE
Discharge instructions reviewed with patient. No questions at this time. Education given for new homegoing medications with type, uses, and most common side effects. Patient verbalized understanding. Handout on dizziness, BPPV given. Telemetry removed and left at nurses station. IV removed. Discharged home in stable condition.

## 2024-08-14 NOTE — DISCHARGE SUMMARY
Discharge Diagnosis  Benign paroxysmal positional vertigo of right ear    Issues Requiring Follow-Up  HFpEF: recommend PCP follow up  Diabetes: recommend PCP follow up  BPPV: Recommend OP vestibular PT    Discharge Meds     Your medication list        CHANGE how you take these medications        Instructions Last Dose Given Next Dose Due   carvedilol 6.25 mg tablet  Commonly known as: Coreg  What changed: when to take this      Take 1 tablet (6.25 mg) by mouth 2 times a day.       Trulicity 3 mg/0.5 mL pen injector  Generic drug: dulaglutide  What changed: when to take this      Inject 3 mg under the skin every 7 days.              CONTINUE taking these medications        Instructions Last Dose Given Next Dose Due   amLODIPine 10 mg tablet  Commonly known as: Norvasc      Take 1 tablet (10 mg) by mouth once daily.       atorvastatin 40 mg tablet  Commonly known as: Lipitor      Take 1 tablet (40 mg) by mouth once daily.       clopidogrel 75 mg tablet  Commonly known as: Plavix      Take 1 tablet (75 mg) by mouth once daily.       omeprazole 40 mg DR capsule  Commonly known as: PriLOSEC      Take 1 capsule (40 mg) by mouth once daily.              STOP taking these medications      Combivent Respimat  mcg/actuation inhaler  Generic drug: ipratropium-albuteroL        lidocaine 5 % patch  Commonly known as: Lidoderm        methocarbamol 500 mg tablet  Commonly known as: Robaxin        nicotine 21 mg/24 hr patch  Commonly known as: Nicoderm CQ        nitroglycerin 0.4 mg SL tablet  Commonly known as: Nitrostat        olmesartan-hydrochlorothiazide 40-25 mg tablet  Commonly known as: BENIcar HCT        senna 8.6 mg tablet  Generic drug: sennosides        varenicline 1 mg tablet  Commonly known as: Chantix                  Where to Get Your Medications        These medications were sent to [facility]  [id number], [facility] [address]      Hours: 9 AM to 5 PM Mon-Fri Phone: [phone]   amLODIPine  10 mg tablet  atorvastatin 40 mg tablet  carvedilol 6.25 mg tablet  clopidogrel 75 mg tablet  omeprazole 40 mg DR capsule  Trulicity 3 mg/0.5 mL pen injector         Test Results Pending At Discharge  Pending Labs       Order Current Status    Urine Culture Preliminary result            Hospital Course   Patient was admitted for a fall after syncope. Workup included CT, echocardiogram, carotid doppler, infectious workup, and laboratory studies. Significant findings were HFpEF and a possible UTI. She was treated with a short course of antibiotics for her UTI but was asymptomatic so this was stopped at discharge as the cause of her syncopal episode was likely BPPV. She had a very positive [place] Hallpike and responded to vestibular maneuvers with physical therapy.     She had been off medications many months so her DM and HTN were not well controlled so we re-started medications and will need to follow up with her primary care provider for further titration and management.    Patient did suffer a head laceration that was repaired by the emergency department and she had a tetanus booster.    Pertinent Physical Exam At Time of Discharge  Physical Exam  Physical Exam  Constitutional:       Appearance: Normal appearance.   HENT:      Mouth/Throat:      Mouth: Mucous membranes are moist.   Cardiovascular:      Rate and Rhythm: Normal rate and regular rhythm.      Heart sounds: No murmur heard.  Pulmonary:      Effort: Pulmonary effort is normal.      Breath sounds: Normal breath sounds.   Abdominal:      General: Abdomen is flat. Bowel sounds are normal.      Palpations: Abdomen is soft.   Neurological:      Mental Status: She is alert.      Comments: Positive [place] Hallpike maneuver     Outpatient Follow-Up  No future appointments.      Mehul Francisco MD

## 2024-08-16 LAB — BACTERIA UR CULT: ABNORMAL

## 2025-07-17 NOTE — CARE PLAN
NOY assisted with [employer] to help pt pay for part of her medications.  She states she spoke with Medicaid who tells her they are pushing her application through.  This should help with this hospital bill and prescriptions in the future.  
SW consulted re pt wanting to talk about insurance.  Pt states vishal lost job 3 mos ago and they lost their insurance.  She has been unable to get her medications which cost $1,550. without insurance.  Pt did apply for Medicaid and had not heard back form them yet.  She started to re-apply again, but was admitted to the hospital.  Explained she will need to follow up with Medicaid.  
SW consulted re: pt's self-pay status.  Into see pt who told me her sheldonb lost his job 3 mos ago so she has not had insurance and unable to pay for her prescriptions which cost $1,500./mo.  She   
TTE  6/24/2025 : EF 57%, mild MR, mild AI, , mild TR